# Patient Record
Sex: MALE | Race: BLACK OR AFRICAN AMERICAN | NOT HISPANIC OR LATINO | URBAN - METROPOLITAN AREA
[De-identification: names, ages, dates, MRNs, and addresses within clinical notes are randomized per-mention and may not be internally consistent; named-entity substitution may affect disease eponyms.]

---

## 2017-08-17 ENCOUNTER — EMERGENCY (EMERGENCY)
Facility: HOSPITAL | Age: 57
LOS: 1 days | Discharge: HOME | End: 2017-08-17
Attending: EMERGENCY MEDICINE | Admitting: EMERGENCY MEDICINE
Payer: COMMERCIAL

## 2017-08-17 VITALS
SYSTOLIC BLOOD PRESSURE: 160 MMHG | TEMPERATURE: 98 F | DIASTOLIC BLOOD PRESSURE: 90 MMHG | HEART RATE: 85 BPM | RESPIRATION RATE: 18 BRPM | OXYGEN SATURATION: 98 %

## 2017-08-17 VITALS — DIASTOLIC BLOOD PRESSURE: 71 MMHG | OXYGEN SATURATION: 100 % | SYSTOLIC BLOOD PRESSURE: 132 MMHG | HEART RATE: 66 BPM

## 2017-08-17 DIAGNOSIS — Z98.89 OTHER SPECIFIED POSTPROCEDURAL STATES: Chronic | ICD-10-CM

## 2017-08-17 LAB
ALBUMIN SERPL ELPH-MCNC: 4.9 G/DL — SIGNIFICANT CHANGE UP (ref 3.3–5)
ALP SERPL-CCNC: 69 U/L — SIGNIFICANT CHANGE UP (ref 40–120)
ALT FLD-CCNC: 16 U/L — SIGNIFICANT CHANGE UP (ref 4–41)
APPEARANCE UR: CLEAR — SIGNIFICANT CHANGE UP
AST SERPL-CCNC: 21 U/L — SIGNIFICANT CHANGE UP (ref 4–40)
BASE EXCESS BLDV CALC-SCNC: 3.4 MMOL/L — SIGNIFICANT CHANGE UP
BASE EXCESS BLDV CALC-SCNC: 4.6 MMOL/L — SIGNIFICANT CHANGE UP
BASOPHILS # BLD AUTO: 0.02 K/UL — SIGNIFICANT CHANGE UP (ref 0–0.2)
BASOPHILS NFR BLD AUTO: 0.2 % — SIGNIFICANT CHANGE UP (ref 0–2)
BILIRUB SERPL-MCNC: 0.4 MG/DL — SIGNIFICANT CHANGE UP (ref 0.2–1.2)
BILIRUB UR-MCNC: NEGATIVE — SIGNIFICANT CHANGE UP
BLOOD GAS VENOUS - CREATININE: 0.85 MG/DL — SIGNIFICANT CHANGE UP (ref 0.5–1.3)
BLOOD GAS VENOUS - CREATININE: SIGNIFICANT CHANGE UP MG/DL (ref 0.5–1.3)
BLOOD UR QL VISUAL: HIGH
BUN SERPL-MCNC: 9 MG/DL — SIGNIFICANT CHANGE UP (ref 7–23)
BUN SERPL-MCNC: 9 MG/DL — SIGNIFICANT CHANGE UP (ref 7–23)
CALCIUM SERPL-MCNC: 9.3 MG/DL — SIGNIFICANT CHANGE UP (ref 8.4–10.5)
CALCIUM SERPL-MCNC: 9.4 MG/DL — SIGNIFICANT CHANGE UP (ref 8.4–10.5)
CHLORIDE BLDV-SCNC: 103 MMOL/L — SIGNIFICANT CHANGE UP (ref 96–108)
CHLORIDE BLDV-SCNC: 106 MMOL/L — SIGNIFICANT CHANGE UP (ref 96–108)
CHLORIDE SERPL-SCNC: 101 MMOL/L — SIGNIFICANT CHANGE UP (ref 98–107)
CHLORIDE SERPL-SCNC: 106 MMOL/L — SIGNIFICANT CHANGE UP (ref 98–107)
CO2 SERPL-SCNC: 25 MMOL/L — SIGNIFICANT CHANGE UP (ref 22–31)
CO2 SERPL-SCNC: 26 MMOL/L — SIGNIFICANT CHANGE UP (ref 22–31)
COLOR SPEC: SIGNIFICANT CHANGE UP
CREAT SERPL-MCNC: 0.92 MG/DL — SIGNIFICANT CHANGE UP (ref 0.5–1.3)
CREAT SERPL-MCNC: 1.02 MG/DL — SIGNIFICANT CHANGE UP (ref 0.5–1.3)
EOSINOPHIL # BLD AUTO: 0.01 K/UL — SIGNIFICANT CHANGE UP (ref 0–0.5)
EOSINOPHIL NFR BLD AUTO: 0.1 % — SIGNIFICANT CHANGE UP (ref 0–6)
GAS PNL BLDV: 141 MMOL/L — SIGNIFICANT CHANGE UP (ref 136–146)
GAS PNL BLDV: 142 MMOL/L — SIGNIFICANT CHANGE UP (ref 136–146)
GLUCOSE BLDV-MCNC: 103 — HIGH (ref 70–99)
GLUCOSE BLDV-MCNC: 136 — HIGH (ref 70–99)
GLUCOSE SERPL-MCNC: 131 MG/DL — HIGH (ref 70–99)
GLUCOSE SERPL-MCNC: 96 MG/DL — SIGNIFICANT CHANGE UP (ref 70–99)
GLUCOSE UR-MCNC: NEGATIVE — SIGNIFICANT CHANGE UP
HCO3 BLDV-SCNC: 25 MMOL/L — SIGNIFICANT CHANGE UP (ref 20–27)
HCO3 BLDV-SCNC: 25 MMOL/L — SIGNIFICANT CHANGE UP (ref 20–27)
HCT VFR BLD CALC: 46.3 % — SIGNIFICANT CHANGE UP (ref 39–50)
HCT VFR BLDV CALC: 46.9 % — SIGNIFICANT CHANGE UP (ref 39–51)
HCT VFR BLDV CALC: 49.7 % — SIGNIFICANT CHANGE UP (ref 39–51)
HGB BLD-MCNC: 15.8 G/DL — SIGNIFICANT CHANGE UP (ref 13–17)
HGB BLDV-MCNC: 15.3 G/DL — SIGNIFICANT CHANGE UP (ref 13–17)
HGB BLDV-MCNC: 16.2 G/DL — SIGNIFICANT CHANGE UP (ref 13–17)
IMM GRANULOCYTES # BLD AUTO: 0.07 # — SIGNIFICANT CHANGE UP
IMM GRANULOCYTES NFR BLD AUTO: 0.7 % — SIGNIFICANT CHANGE UP (ref 0–1.5)
KETONES UR-MCNC: NEGATIVE — SIGNIFICANT CHANGE UP
LACTATE BLDV-MCNC: 2.2 MMOL/L — HIGH (ref 0.5–2)
LACTATE BLDV-MCNC: 2.5 MMOL/L — HIGH (ref 0.5–2)
LEUKOCYTE ESTERASE UR-ACNC: NEGATIVE — SIGNIFICANT CHANGE UP
LYMPHOCYTES # BLD AUTO: 1.29 K/UL — SIGNIFICANT CHANGE UP (ref 1–3.3)
LYMPHOCYTES # BLD AUTO: 12.1 % — LOW (ref 13–44)
MCHC RBC-ENTMCNC: 30 PG — SIGNIFICANT CHANGE UP (ref 27–34)
MCHC RBC-ENTMCNC: 34.1 % — SIGNIFICANT CHANGE UP (ref 32–36)
MCV RBC AUTO: 88 FL — SIGNIFICANT CHANGE UP (ref 80–100)
MONOCYTES # BLD AUTO: 0.52 K/UL — SIGNIFICANT CHANGE UP (ref 0–0.9)
MONOCYTES NFR BLD AUTO: 4.9 % — SIGNIFICANT CHANGE UP (ref 2–14)
MUCOUS THREADS # UR AUTO: SIGNIFICANT CHANGE UP
NEUTROPHILS # BLD AUTO: 8.79 K/UL — HIGH (ref 1.8–7.4)
NEUTROPHILS NFR BLD AUTO: 82 % — HIGH (ref 43–77)
NITRITE UR-MCNC: NEGATIVE — SIGNIFICANT CHANGE UP
NRBC # FLD: 0 — SIGNIFICANT CHANGE UP
PCO2 BLDV: 58 MMHG — HIGH (ref 41–51)
PCO2 BLDV: 61 MMHG — HIGH (ref 41–51)
PH BLDV: 7.32 PH — SIGNIFICANT CHANGE UP (ref 7.32–7.43)
PH BLDV: 7.32 PH — SIGNIFICANT CHANGE UP (ref 7.32–7.43)
PH UR: 6 — SIGNIFICANT CHANGE UP (ref 4.6–8)
PLATELET # BLD AUTO: 190 K/UL — SIGNIFICANT CHANGE UP (ref 150–400)
PMV BLD: 9.4 FL — SIGNIFICANT CHANGE UP (ref 7–13)
PO2 BLDV: 29 MMHG — LOW (ref 35–40)
PO2 BLDV: < 24 MMHG — LOW (ref 35–40)
POTASSIUM BLDV-SCNC: 3.3 MMOL/L — LOW (ref 3.4–4.5)
POTASSIUM BLDV-SCNC: 4.3 MMOL/L — SIGNIFICANT CHANGE UP (ref 3.4–4.5)
POTASSIUM SERPL-MCNC: 3.6 MMOL/L — SIGNIFICANT CHANGE UP (ref 3.5–5.3)
POTASSIUM SERPL-MCNC: 4.2 MMOL/L — SIGNIFICANT CHANGE UP (ref 3.5–5.3)
POTASSIUM SERPL-SCNC: 3.6 MMOL/L — SIGNIFICANT CHANGE UP (ref 3.5–5.3)
POTASSIUM SERPL-SCNC: 4.2 MMOL/L — SIGNIFICANT CHANGE UP (ref 3.5–5.3)
PROT SERPL-MCNC: 8.3 G/DL — SIGNIFICANT CHANGE UP (ref 6–8.3)
PROT UR-MCNC: NEGATIVE — SIGNIFICANT CHANGE UP
RBC # BLD: 5.26 M/UL — SIGNIFICANT CHANGE UP (ref 4.2–5.8)
RBC # FLD: 12.4 % — SIGNIFICANT CHANGE UP (ref 10.3–14.5)
RBC CASTS # UR COMP ASSIST: >50 — HIGH (ref 0–?)
SAO2 % BLDV: 24.4 % — LOW (ref 60–85)
SAO2 % BLDV: 45.9 % — LOW (ref 60–85)
SODIUM SERPL-SCNC: 143 MMOL/L — SIGNIFICANT CHANGE UP (ref 135–145)
SODIUM SERPL-SCNC: 145 MMOL/L — SIGNIFICANT CHANGE UP (ref 135–145)
SP GR SPEC: 1.01 — SIGNIFICANT CHANGE UP (ref 1–1.03)
UROBILINOGEN FLD QL: NORMAL E.U. — SIGNIFICANT CHANGE UP (ref 0.1–0.2)
WBC # BLD: 10.7 K/UL — HIGH (ref 3.8–10.5)
WBC # FLD AUTO: 10.7 K/UL — HIGH (ref 3.8–10.5)
WBC UR QL: SIGNIFICANT CHANGE UP (ref 0–?)

## 2017-08-17 PROCEDURE — 74176 CT ABD & PELVIS W/O CONTRAST: CPT | Mod: 26

## 2017-08-17 PROCEDURE — 99284 EMERGENCY DEPT VISIT MOD MDM: CPT

## 2017-08-17 RX ORDER — SODIUM CHLORIDE 9 MG/ML
500 INJECTION INTRAMUSCULAR; INTRAVENOUS; SUBCUTANEOUS ONCE
Qty: 0 | Refills: 0 | Status: COMPLETED | OUTPATIENT
Start: 2017-08-17 | End: 2017-08-17

## 2017-08-17 RX ORDER — MORPHINE SULFATE 50 MG/1
4 CAPSULE, EXTENDED RELEASE ORAL ONCE
Qty: 0 | Refills: 0 | Status: DISCONTINUED | OUTPATIENT
Start: 2017-08-17 | End: 2017-08-17

## 2017-08-17 RX ORDER — SODIUM CHLORIDE 9 MG/ML
1000 INJECTION INTRAMUSCULAR; INTRAVENOUS; SUBCUTANEOUS ONCE
Qty: 0 | Refills: 0 | Status: COMPLETED | OUTPATIENT
Start: 2017-08-17 | End: 2017-08-17

## 2017-08-17 RX ORDER — LIDOCAINE HCL 20 MG/ML
5 VIAL (ML) INJECTION ONCE
Qty: 0 | Refills: 0 | Status: COMPLETED | OUTPATIENT
Start: 2017-08-17 | End: 2017-08-17

## 2017-08-17 RX ADMIN — SODIUM CHLORIDE 1000 MILLILITER(S): 9 INJECTION INTRAMUSCULAR; INTRAVENOUS; SUBCUTANEOUS at 18:01

## 2017-08-17 RX ADMIN — Medication 5 MILLILITER(S): at 15:18

## 2017-08-17 RX ADMIN — SODIUM CHLORIDE 500 MILLILITER(S): 9 INJECTION INTRAMUSCULAR; INTRAVENOUS; SUBCUTANEOUS at 15:41

## 2017-08-17 RX ADMIN — MORPHINE SULFATE 4 MILLIGRAM(S): 50 CAPSULE, EXTENDED RELEASE ORAL at 16:00

## 2017-08-17 RX ADMIN — MORPHINE SULFATE 4 MILLIGRAM(S): 50 CAPSULE, EXTENDED RELEASE ORAL at 15:18

## 2017-08-17 NOTE — ED PROVIDER NOTE - PLAN OF CARE
PLEASE MAKE SURE THAT YOU FOLLOW UP WITH YOUR UROLOGIST WITHIN NEXT 3-4 DAYS, FOR NOW WE NEED TO LEAVE THE CATHETER IN. IF WE FIND ANYTHING ON THE URINE CULTURE WE WILL CALL YOU. RETURN TO ER FOR BLOOD IN URINE, FEVER, ABDOMINAL PAIN, LOWER BACK PAIN OR IF THE CATHETER IS NOT DRAINING.

## 2017-08-17 NOTE — ED PROVIDER NOTE - PROGRESS NOTE DETAILS
ROBLES MERRILL inserted by RN with about 800cc of cheryl urine output. ROBLES Flannery - pt is still pain free; FC drained about 1200cc of urie, ct with evidence of recently passed stone - will dc with leg bag, close gu f/u.

## 2017-08-17 NOTE — ED PROVIDER NOTE - OBJECTIVE STATEMENT
Pt is 58 y/o male with PMhx of kidney stone here for eval of suprapubic pain and inability to urinate since 5am today. Pt states that he initially noted vague lower back pain (Lt>Rt) with radiation to Rt flank, got concerned as he has been having urinary urgency but not able to urinate, (-) fever, chills, denies dysuria or hematuria prior, admits that pain feels like kidney stone.   - Dr nguyen, non-[facility]. (-) testicular pain, penile discharge, denies hx f STds.

## 2017-08-17 NOTE — ED ADULT NURSE NOTE - OBJECTIVE STATEMENT
pt received a&ox3, c/o inability to urinate since 5/6 this morning, pt c/o left lower quad and back pain, pt appears extremely uncomfortable, md notified immediately, 16 Vietnamese maria placed , draining initially blood tinged urine most likely from trauma of entry, clear/yellow urine draining at bedside, 20 gauge IV placed in left ac, labs drawn and sent, will continue to monitor .

## 2017-08-17 NOTE — ED PROVIDER NOTE - ATTENDING CONTRIBUTION TO CARE
57 year old M w hx of kidney stone presents w abdominal pain and distention for past 24 hours.  US shows urinary retention.  PE + abdominal distention + diffuse tenderness  lungs cta b/l neuro nonfocal exam Impression:  Urinary Retention w possible stone. ct pending.

## 2017-08-17 NOTE — ED PROVIDER NOTE - CARE PLAN
Instructions for follow-up, activity and diet:	PLEASE MAKE SURE THAT YOU FOLLOW UP WITH YOUR UROLOGIST WITHIN NEXT 3-4 DAYS, FOR NOW WE NEED TO LEAVE THE CATHETER IN. IF WE FIND ANYTHING ON THE URINE CULTURE WE WILL CALL YOU. RETURN TO ER FOR BLOOD IN URINE, FEVER, ABDOMINAL PAIN, LOWER BACK PAIN OR IF THE CATHETER IS NOT DRAINING. Principal Discharge DX:	Urinary retention  Instructions for follow-up, activity and diet:	PLEASE MAKE SURE THAT YOU FOLLOW UP WITH YOUR UROLOGIST WITHIN NEXT 3-4 DAYS, FOR NOW WE NEED TO LEAVE THE CATHETER IN. IF WE FIND ANYTHING ON THE URINE CULTURE WE WILL CALL YOU. RETURN TO ER FOR BLOOD IN URINE, FEVER, ABDOMINAL PAIN, LOWER BACK PAIN OR IF THE CATHETER IS NOT DRAINING.

## 2017-08-17 NOTE — ED ADULT TRIAGE NOTE - CHIEF COMPLAINT QUOTE
p/t c/o of lower back pain radiating to groin area since this am p/t appears uncomfortable hx kidney stones

## 2017-08-18 LAB
BACTERIA UR CULT: SIGNIFICANT CHANGE UP
SPECIMEN SOURCE: SIGNIFICANT CHANGE UP

## 2017-10-13 ENCOUNTER — OUTPATIENT (OUTPATIENT)
Dept: OUTPATIENT SERVICES | Facility: HOSPITAL | Age: 57
LOS: 1 days | End: 2017-10-13
Payer: COMMERCIAL

## 2017-10-13 DIAGNOSIS — Z98.89 OTHER SPECIFIED POSTPROCEDURAL STATES: Chronic | ICD-10-CM

## 2017-10-13 DIAGNOSIS — R05 COUGH: ICD-10-CM

## 2017-10-13 PROCEDURE — 71020: CPT | Mod: 26

## 2017-10-13 PROCEDURE — 93005 ELECTROCARDIOGRAM TRACING: CPT

## 2017-10-13 PROCEDURE — 71046 X-RAY EXAM CHEST 2 VIEWS: CPT

## 2017-10-13 PROCEDURE — 93010 ELECTROCARDIOGRAM REPORT: CPT

## 2017-10-22 ENCOUNTER — INPATIENT (INPATIENT)
Facility: HOSPITAL | Age: 57
LOS: 2 days | Discharge: HOME | End: 2017-10-25
Attending: HOSPITALIST | Admitting: HOSPITALIST
Payer: COMMERCIAL

## 2017-10-22 VITALS
TEMPERATURE: 98 F | OXYGEN SATURATION: 99 % | RESPIRATION RATE: 18 BRPM | SYSTOLIC BLOOD PRESSURE: 146 MMHG | HEART RATE: 86 BPM | DIASTOLIC BLOOD PRESSURE: 88 MMHG

## 2017-10-22 DIAGNOSIS — Z98.89 OTHER SPECIFIED POSTPROCEDURAL STATES: Chronic | ICD-10-CM

## 2017-10-22 DIAGNOSIS — R05 COUGH: ICD-10-CM

## 2017-10-22 DIAGNOSIS — K21.9 GASTRO-ESOPHAGEAL REFLUX DISEASE WITHOUT ESOPHAGITIS: ICD-10-CM

## 2017-10-22 DIAGNOSIS — N17.9 ACUTE KIDNEY FAILURE, UNSPECIFIED: ICD-10-CM

## 2017-10-22 DIAGNOSIS — R33.9 RETENTION OF URINE, UNSPECIFIED: ICD-10-CM

## 2017-10-22 DIAGNOSIS — R10.9 UNSPECIFIED ABDOMINAL PAIN: ICD-10-CM

## 2017-10-22 DIAGNOSIS — Z98.890 OTHER SPECIFIED POSTPROCEDURAL STATES: Chronic | ICD-10-CM

## 2017-10-22 DIAGNOSIS — N12 TUBULO-INTERSTITIAL NEPHRITIS, NOT SPECIFIED AS ACUTE OR CHRONIC: ICD-10-CM

## 2017-10-22 DIAGNOSIS — N13.8 OTHER OBSTRUCTIVE AND REFLUX UROPATHY: ICD-10-CM

## 2017-10-22 DIAGNOSIS — I10 ESSENTIAL (PRIMARY) HYPERTENSION: ICD-10-CM

## 2017-10-22 DIAGNOSIS — Z29.9 ENCOUNTER FOR PROPHYLACTIC MEASURES, UNSPECIFIED: ICD-10-CM

## 2017-10-22 LAB
ALBUMIN SERPL ELPH-MCNC: 4.4 G/DL — SIGNIFICANT CHANGE UP (ref 3.3–5)
ALP SERPL-CCNC: 69 U/L — SIGNIFICANT CHANGE UP (ref 40–120)
ALT FLD-CCNC: 33 U/L — SIGNIFICANT CHANGE UP (ref 4–41)
APPEARANCE UR: SIGNIFICANT CHANGE UP
APTT BLD: 27.9 SEC — SIGNIFICANT CHANGE UP (ref 27.5–37.4)
AST SERPL-CCNC: 24 U/L — SIGNIFICANT CHANGE UP (ref 4–40)
BACTERIA # UR AUTO: HIGH
BASE EXCESS BLDV CALC-SCNC: 6.6 MMOL/L — SIGNIFICANT CHANGE UP
BASOPHILS # BLD AUTO: 0.03 K/UL — SIGNIFICANT CHANGE UP (ref 0–0.2)
BASOPHILS NFR BLD AUTO: 0.3 % — SIGNIFICANT CHANGE UP (ref 0–2)
BILIRUB SERPL-MCNC: 0.7 MG/DL — SIGNIFICANT CHANGE UP (ref 0.2–1.2)
BILIRUB UR-MCNC: NEGATIVE — SIGNIFICANT CHANGE UP
BLD GP AB SCN SERPL QL: NEGATIVE — SIGNIFICANT CHANGE UP
BLOOD GAS VENOUS - CREATININE: 2.72 MG/DL — HIGH (ref 0.5–1.3)
BLOOD UR QL VISUAL: HIGH
BUN SERPL-MCNC: 24 MG/DL — HIGH (ref 7–23)
CALCIUM SERPL-MCNC: 9.5 MG/DL — SIGNIFICANT CHANGE UP (ref 8.4–10.5)
CHLORIDE BLDV-SCNC: 101 MMOL/L — SIGNIFICANT CHANGE UP (ref 96–108)
CHLORIDE SERPL-SCNC: 98 MMOL/L — SIGNIFICANT CHANGE UP (ref 98–107)
CO2 SERPL-SCNC: 28 MMOL/L — SIGNIFICANT CHANGE UP (ref 22–31)
COLOR SPEC: YELLOW — SIGNIFICANT CHANGE UP
CREAT SERPL-MCNC: 2.7 MG/DL — HIGH (ref 0.5–1.3)
EOSINOPHIL # BLD AUTO: 0.02 K/UL — SIGNIFICANT CHANGE UP (ref 0–0.5)
EOSINOPHIL NFR BLD AUTO: 0.2 % — SIGNIFICANT CHANGE UP (ref 0–6)
GAS PNL BLDV: 138 MMOL/L — SIGNIFICANT CHANGE UP (ref 136–146)
GLUCOSE BLDV-MCNC: 121 — HIGH (ref 70–99)
GLUCOSE SERPL-MCNC: 117 MG/DL — HIGH (ref 70–99)
GLUCOSE UR-MCNC: NEGATIVE — SIGNIFICANT CHANGE UP
HCO3 BLDV-SCNC: 27 MMOL/L — SIGNIFICANT CHANGE UP (ref 20–27)
HCT VFR BLD CALC: 42.9 % — SIGNIFICANT CHANGE UP (ref 39–50)
HCT VFR BLDV CALC: 46.9 % — SIGNIFICANT CHANGE UP (ref 39–51)
HGB BLD-MCNC: 14.5 G/DL — SIGNIFICANT CHANGE UP (ref 13–17)
HGB BLDV-MCNC: 15.3 G/DL — SIGNIFICANT CHANGE UP (ref 13–17)
IMM GRANULOCYTES # BLD AUTO: 0.05 # — SIGNIFICANT CHANGE UP
IMM GRANULOCYTES NFR BLD AUTO: 0.4 % — SIGNIFICANT CHANGE UP (ref 0–1.5)
INR BLD: 1.3 — HIGH (ref 0.88–1.17)
KETONES UR-MCNC: NEGATIVE — SIGNIFICANT CHANGE UP
LACTATE BLDV-MCNC: 1.2 MMOL/L — SIGNIFICANT CHANGE UP (ref 0.5–2)
LEUKOCYTE ESTERASE UR-ACNC: HIGH
LIDOCAIN IGE QN: 12.4 U/L — SIGNIFICANT CHANGE UP (ref 7–60)
LYMPHOCYTES # BLD AUTO: 0.97 K/UL — LOW (ref 1–3.3)
LYMPHOCYTES # BLD AUTO: 8.2 % — LOW (ref 13–44)
MCHC RBC-ENTMCNC: 29.7 PG — SIGNIFICANT CHANGE UP (ref 27–34)
MCHC RBC-ENTMCNC: 33.8 % — SIGNIFICANT CHANGE UP (ref 32–36)
MCV RBC AUTO: 87.7 FL — SIGNIFICANT CHANGE UP (ref 80–100)
MONOCYTES # BLD AUTO: 0.94 K/UL — HIGH (ref 0–0.9)
MONOCYTES NFR BLD AUTO: 7.9 % — SIGNIFICANT CHANGE UP (ref 2–14)
NEUTROPHILS # BLD AUTO: 9.82 K/UL — HIGH (ref 1.8–7.4)
NEUTROPHILS NFR BLD AUTO: 83 % — HIGH (ref 43–77)
NITRITE UR-MCNC: NEGATIVE — SIGNIFICANT CHANGE UP
NRBC # FLD: 0 — SIGNIFICANT CHANGE UP
PCO2 BLDV: 57 MMHG — HIGH (ref 41–51)
PH BLDV: 7.37 PH — SIGNIFICANT CHANGE UP (ref 7.32–7.43)
PH UR: 6 — SIGNIFICANT CHANGE UP (ref 4.6–8)
PLATELET # BLD AUTO: 234 K/UL — SIGNIFICANT CHANGE UP (ref 150–400)
PMV BLD: 8.7 FL — SIGNIFICANT CHANGE UP (ref 7–13)
PO2 BLDV: < 24 MMHG — LOW (ref 35–40)
POTASSIUM BLDV-SCNC: 3.6 MMOL/L — SIGNIFICANT CHANGE UP (ref 3.4–4.5)
POTASSIUM SERPL-MCNC: 3.7 MMOL/L — SIGNIFICANT CHANGE UP (ref 3.5–5.3)
POTASSIUM SERPL-SCNC: 3.7 MMOL/L — SIGNIFICANT CHANGE UP (ref 3.5–5.3)
PROT SERPL-MCNC: 8.3 G/DL — SIGNIFICANT CHANGE UP (ref 6–8.3)
PROT UR-MCNC: 100 — SIGNIFICANT CHANGE UP
PROTHROM AB SERPL-ACNC: 14.6 SEC — HIGH (ref 9.8–13.1)
RBC # BLD: 4.89 M/UL — SIGNIFICANT CHANGE UP (ref 4.2–5.8)
RBC # FLD: 13.1 % — SIGNIFICANT CHANGE UP (ref 10.3–14.5)
RBC CASTS # UR COMP ASSIST: SIGNIFICANT CHANGE UP (ref 0–?)
RH IG SCN BLD-IMP: POSITIVE — SIGNIFICANT CHANGE UP
SAO2 % BLDV: 29.8 % — LOW (ref 60–85)
SODIUM SERPL-SCNC: 141 MMOL/L — SIGNIFICANT CHANGE UP (ref 135–145)
SP GR SPEC: 1.02 — SIGNIFICANT CHANGE UP (ref 1–1.03)
UROBILINOGEN FLD QL: NORMAL E.U. — SIGNIFICANT CHANGE UP (ref 0.1–0.2)
WBC # BLD: 11.83 K/UL — HIGH (ref 3.8–10.5)
WBC # FLD AUTO: 11.83 K/UL — HIGH (ref 3.8–10.5)
WBC UR QL: >50 — HIGH (ref 0–?)

## 2017-10-22 PROCEDURE — 99223 1ST HOSP IP/OBS HIGH 75: CPT | Mod: GC

## 2017-10-22 RX ORDER — ACETAMINOPHEN 500 MG
650 TABLET ORAL EVERY 6 HOURS
Qty: 0 | Refills: 0 | Status: DISCONTINUED | OUTPATIENT
Start: 2017-10-22 | End: 2017-10-25

## 2017-10-22 RX ORDER — TAMSULOSIN HYDROCHLORIDE 0.4 MG/1
0.4 CAPSULE ORAL AT BEDTIME
Qty: 0 | Refills: 0 | Status: DISCONTINUED | OUTPATIENT
Start: 2017-10-22 | End: 2017-10-24

## 2017-10-22 RX ORDER — MOMETASONE FUROATE 50 UG/1
2 SPRAY NASAL
Qty: 0 | Refills: 0 | COMMUNITY

## 2017-10-22 RX ORDER — ESOMEPRAZOLE MAGNESIUM 40 MG/1
0 CAPSULE, DELAYED RELEASE ORAL
Qty: 0 | Refills: 0 | COMMUNITY

## 2017-10-22 RX ORDER — MORPHINE SULFATE 50 MG/1
4 CAPSULE, EXTENDED RELEASE ORAL ONCE
Qty: 0 | Refills: 0 | Status: DISCONTINUED | OUTPATIENT
Start: 2017-10-22 | End: 2017-10-22

## 2017-10-22 RX ORDER — LORATADINE 10 MG/1
1 TABLET ORAL
Qty: 0 | Refills: 0 | COMMUNITY

## 2017-10-22 RX ORDER — CEFTRIAXONE 500 MG/1
1 INJECTION, POWDER, FOR SOLUTION INTRAMUSCULAR; INTRAVENOUS ONCE
Qty: 0 | Refills: 0 | Status: COMPLETED | OUTPATIENT
Start: 2017-10-22 | End: 2017-10-22

## 2017-10-22 RX ORDER — CEFTRIAXONE 500 MG/1
1 INJECTION, POWDER, FOR SOLUTION INTRAMUSCULAR; INTRAVENOUS EVERY 24 HOURS
Qty: 0 | Refills: 0 | Status: DISCONTINUED | OUTPATIENT
Start: 2017-10-23 | End: 2017-10-25

## 2017-10-22 RX ORDER — AMLODIPINE BESYLATE 2.5 MG/1
10 TABLET ORAL DAILY
Qty: 0 | Refills: 0 | Status: DISCONTINUED | OUTPATIENT
Start: 2017-10-22 | End: 2017-10-25

## 2017-10-22 RX ORDER — DOCUSATE SODIUM 100 MG
100 CAPSULE ORAL
Qty: 0 | Refills: 0 | Status: DISCONTINUED | OUTPATIENT
Start: 2017-10-22 | End: 2017-10-25

## 2017-10-22 RX ORDER — FLUTICASONE PROPIONATE 50 MCG
1 SPRAY, SUSPENSION NASAL DAILY
Qty: 0 | Refills: 0 | Status: DISCONTINUED | OUTPATIENT
Start: 2017-10-22 | End: 2017-10-25

## 2017-10-22 RX ORDER — SODIUM CHLORIDE 9 MG/ML
1000 INJECTION INTRAMUSCULAR; INTRAVENOUS; SUBCUTANEOUS ONCE
Qty: 0 | Refills: 0 | Status: COMPLETED | OUTPATIENT
Start: 2017-10-22 | End: 2017-10-22

## 2017-10-22 RX ORDER — INFLUENZA VIRUS VACCINE 15; 15; 15; 15 UG/.5ML; UG/.5ML; UG/.5ML; UG/.5ML
0.5 SUSPENSION INTRAMUSCULAR ONCE
Qty: 0 | Refills: 0 | Status: COMPLETED | OUTPATIENT
Start: 2017-10-22 | End: 2017-10-22

## 2017-10-22 RX ADMIN — CEFTRIAXONE 100 GRAM(S): 500 INJECTION, POWDER, FOR SOLUTION INTRAMUSCULAR; INTRAVENOUS at 13:24

## 2017-10-22 RX ADMIN — SODIUM CHLORIDE 1000 MILLILITER(S): 9 INJECTION INTRAMUSCULAR; INTRAVENOUS; SUBCUTANEOUS at 12:17

## 2017-10-22 RX ADMIN — Medication 100 MILLIGRAM(S): at 18:36

## 2017-10-22 RX ADMIN — TAMSULOSIN HYDROCHLORIDE 0.4 MILLIGRAM(S): 0.4 CAPSULE ORAL at 22:20

## 2017-10-22 RX ADMIN — MORPHINE SULFATE 4 MILLIGRAM(S): 50 CAPSULE, EXTENDED RELEASE ORAL at 12:32

## 2017-10-22 RX ADMIN — MORPHINE SULFATE 4 MILLIGRAM(S): 50 CAPSULE, EXTENDED RELEASE ORAL at 12:17

## 2017-10-22 NOTE — H&P ADULT - NSHPPHYSICALEXAM_GEN_ALL_CORE
Vital Signs Last 24 Hrs  T(C): 36.8 (22 Oct 2017 15:04), Max: 37.1 (22 Oct 2017 11:53)  T(F): 98.3 (22 Oct 2017 15:04), Max: 98.7 (22 Oct 2017 11:53)  HR: 68 (22 Oct 2017 15:04) (68 - 86)  BP: 119/70 (22 Oct 2017 15:04) (119/70 - 164/86)  BP(mean): --  RR: 18 (22 Oct 2017 15:04) (16 - 18)  SpO2: 99% (22 Oct 2017 15:04) (99% - 100%)    PHYSICAL EXAM:  GENERAL: NAD, well-groomed, well-developed, sitting up in bed  HEAD:  Atraumatic, Normocephalic  EYES: EOMI, PERRLA, conjunctiva, muddy sclera  ENMT: no palatal erythema, no thrust; MMM, good dentition  NECK: Supple, No JVD  NERVOUS SYSTEM: AOX3, motor and sensation grossly intact in b/l UE and b/l LE  PSYCHIATRIC: Appropriate affect and mood  CHEST/LUNG: CTAB, no rales, rhonchi, wheezing, or rubs  HEART: RRR no MRG; no LE edema  ABDOMEN: mildly distended, non-tender to deep palpation except residual pain suprapubic area, NBS, no rigidity or fluid wave  EXTREMITIES:  2+ Peripheral Pulses, No clubbing, cyanosis  SKIN: No rashes or lesions

## 2017-10-22 NOTE — H&P ADULT - PROBLEM SELECTOR PLAN 1
Leukocytosis with neutrophilic predominance, R flank pain concerning for pyelonephritis likely in the setting of post-procedure obstructive nephropathy. Pt recently had laser lithotripsy on 10/20 with progressive suprapubic pain, R flank pain and urinary dysuria/hesitancy since procedure.   -c/w ceftriaxone 1g q24h  -Trend CBC's  -U/A concerning for infection  -Renal and bladder US to r/o hydroureteronephrosis  -Urology consult. Urologist is Dr. Kevin Duarte who could not be reached in ED. Leukocytosis with neutrophilic predominance, R flank pain concerning for pyelonephritis likely in the setting of post-procedure obstructive nephropathy. Pt recently had laser lithotripsy on 10/20 with progressive suprapubic pain, R flank pain and dysuria/ urinary hesitancy since procedure.   -c/w ceftriaxone 1g q24h  -Trend CBC's  -U/A concerning for infection  -Renal and bladder US to r/o hydroureteronephrosis  -Urology consult. Urologist is Dr. Kevin Duarte who could not be reached in ED.

## 2017-10-22 NOTE — ED PROVIDER NOTE - MEDICAL DECISION MAKING DETAILS
lower abdominal pain  - Labs, CT, pain meds lower abdominal pain. Urinary retention vs Pyelonephritis   - Labs, CT, pain meds

## 2017-10-22 NOTE — H&P ADULT - NSHPREVIEWOFSYSTEMS_GEN_ALL_CORE
CONSTITUTIONAL: No fever, weight loss, or fatigue  EYES: No eye pain, visual disturbances, or discharge  ENMT:  No difficulty hearing, tinnitus, vertigo; No sinus or throat pain  RESPIRATORY: +cough worse at night productive white sputum, o/w no wheezing, chills or hemoptysis; No shortness of breath  CARDIOVASCULAR: No chest pain, palpitations, dizziness, or leg swelling  GASTROINTESTINAL: +lower>upper abdominal pain now mostly resolved; No nausea, vomiting, or hematemesis; No diarrhea or constipation. No melena or hematochezia.  GENITOURINARY: +dysuria, frequency, hematuria, hesitancy; no incontinence  NEUROLOGICAL: No headaches, loss of strength, numbness, or tremors  SKIN: No itching, burning, rashes, or lesions   LYMPH NODES: No enlarged glands  ENDOCRINE: No heat or cold intolerance; No polydipsia or polyuria  MUSCULOSKELETAL: No joint pain or swelling;   PSYCHIATRIC: Denies depression, anxiety  HEME/LYMPH: No easy bruising, or bleeding gums  ALLERGY AND IMMUNOLOGIC: No hives or eczema

## 2017-10-22 NOTE — H&P ADULT - PROBLEM SELECTOR PLAN 4
Much improved lower>upper abdominal pain since Hermosillo, though pt also endorses only 1 BM (loose) since procedure in the setting of poor po intake and opiate use. Will start Colace given recent opiate use. No indication for XR abdomen at this time given mostly resolved abdominal pain with Hermosillo.  -Start Colace.  -c/w Hermosillo

## 2017-10-22 NOTE — ED PROVIDER NOTE - OBJECTIVE STATEMENT
56yo M w/ pmhx of HTN, kidney stones s/p lithotripsy on 10/20/17 c/o worsening lower abdominal pain since the procedure. Pt has only one bowel since the procedure, has not been able to pass gas, difficulty defecating. Pt also reports dysuria and hematuria. He denies fever, nausea, vomiting, or any abdominal surgery. 56yo M w/ pmhx of HTN, kidney stones s/p lithotripsy on 10/20/17 c/o worsening lower abdominal pain since the procedure. Pt has only one bowel since the procedure, has not been able to pass gas, reports difficulty urinating, dysuria and hematuria. He denies fever, nausea, vomiting, or any abdominal surgery.

## 2017-10-22 NOTE — H&P ADULT - HISTORY OF PRESENT ILLNESS
Pt is a 56yo M with PMH HTN, GERD, recurrent nephrolithiasis s/p laser lithotripsy lithotripsy on 10/20 (has had 4 lithotripsies in total though this is first laser, the first 13 years ago) presenting with worsening R flank and lower abdominal pain over three days since the procedure. Pain radiated from R flank to anterior low abdomen per patient and is now much improved. He no longer has R flank pain. He is still making urine but endorses dysuria, hesitancy, sensation of incomplete voiding, and hematuria since the procedure with foreign body sensation (like "sand") when he urinates. A Hermosillo was placed in the ED with output of 1.1 L. The patient reports that his lower abdominal pain is markedly improved since Hermosillo placement. He has also had only 1 bowel movement since the procedure; it was loose. He also hasn't been eating well and took 3 Percocet the day of procedure but hasn't required more. Denies fevers, chills, night sweats, head aches, shortness of breath, chest pain, BRBPR, melena, testicular pain or swelling, new numbness or weakness, LOC, joint pain, new rashes or skin lesions. He endorses an occasional cough productive of white sputum beginning one week ago with interval improvement then recurrence one day ago; with increasing abdominal pain he has had more heartburn, occasionally tasting acid in his mouth the past two days. His cough is worse at night, currently improved. For his cough the pt was recently prescribed a 3-day course of azithromycin 500mg and completed the course this past week.     ED course:   Vitals: Tmax 98.7degF, HR 70's-80's, 's/70's, RR 16, SpO2 100% RA.  Labs: U/A large blood, turbid, large LE, neg nitrite, >50 WBC's, many bacteria. VBG pH 7.37 with lactate 1.2 and pCO2 57. SCr 2.70 from baseline 1.02 in 8/2017. WBC 11.83 with ANC 9.82% no bands.   Imaging: no new. Old 8/17/17 CT abd/pelvis: multiple nonobstructing R renal calculi, mild right hydroureteronephrosis which may be due to recently passed calculus.   Interventions: Hermosillo catheter (drained 1150 cc's brown cloudy urine).  Medications: ceftriaxone 1g, morphine 4mg IVP x 1, NS 1 L bolus.

## 2017-10-22 NOTE — ED ADULT NURSE NOTE - OBJECTIVE STATEMENT
pt received a&ox3, multiple c/o, pt s/p lithotripsy x 2 days for kidney stones in right kidney, pt states last bm was right after procedure, c/o abd distention,"fullness", inmabliity to pass gas, lower abd tender to touch, pt also c/o right and left flank pain since procedure, left and right flank tender to touch, pt states he still has hematuria since procedure which he was told was a normal finding, pt c/o burning on urination as well, pt denies NV, vss as reported, pt appears to be in NAD, 20 gauge IV placed in right ac, labs drawn and sent, will continue to monitor.

## 2017-10-22 NOTE — H&P ADULT - PROBLEM SELECTOR PLAN 6
Pt was recently prescribed short course of abx for cough with minimal improvement. Cough is occasionally productive of white sputum. No other URI symptoms, fevers, chills. Suspect 2/2 GERD as pt endorses worsening GERD symptoms since procedure. Will restart PPI when AMARJIT resolves. Lungs CTAB on exam. CXR not indicated.   -Restart PPI when AMARJIT resolves.

## 2017-10-22 NOTE — H&P ADULT - NSHPLABSRESULTS_GEN_ALL_CORE
LABS:                        14.5   11.83 )-----------( 234      ( 22 Oct 2017 11:59 )             42.9     22 Oct 2017 11:59    141    |  98     |  24     ----------------------------<  117    3.7     |  28     |  2.70     Ca    9.5        22 Oct 2017 11:59    TPro  8.3    /  Alb  4.4    /  TBili  0.7    /  DBili  x      /  AST  24     /  ALT  33     /  AlkPhos  69     22 Oct 2017 11:59    PT/INR - ( 22 Oct 2017 12:36 )   PT: 14.6 SEC;   INR: 1.30          PTT - ( 22 Oct 2017 12:36 )  PTT:27.9 SEC    Urinalysis Basic - ( 22 Oct 2017 12:12 )    Color: YELLOW / Appearance: TURBID / S.021 / pH: 6.0  Gluc: NEGATIVE / Ketone: NEGATIVE  / Bili: NEGATIVE / Urobili: NORMAL E.U.   Blood: LARGE / Protein: 100 / Nitrite: NEGATIVE   Leuk Esterase: LARGE / RBC: 25-50 / WBC >50   Sq Epi: x / Non Sq Epi: x / Bacteria: MANY    Blood Gas Venous Comprehensive (10.22.17 @ 11:59)    Blood Gas Venous - Lactate: 1.2: Please note updated reference range. mmol/L    Blood Gas Venous - Chloride: 101 mmol/L    Blood Gas Venous - Creatinine: 2.72: Delta: 0.85 on 17-  Delta: 0.85 on 17- mg/dL    pH, Venous: 7.37 pH    pCO2, Venous: 57 mmHg    pO2, Venous: < 24 mmHg    HCO3, Venous: 27 mmol/L    Base Excess, Venous: 6.6: REFERENCE RANGE = -3 + 2 mmol/L mmol/L    Oxygen Saturation, Venous: 29.8 %    Blood Gas Venous - Sodium: 138 mmol/L    Blood Gas Venous - Potassium: 3.6 mmol/L    Blood Gas Venous - Glucose: 121    Blood Gas Venous - Hemoglobin: 15.3 g/dL    Blood Gas Venous - Hematocrit: 46.9 %    BLOOD CULTURE: none  URINE CULTURE: received    RADIOLOGY & ADDITIONAL TESTS:    Imaging Personally Reviewed:  [ ] YES [x ] N/A    O/w reviewed as per HPI old imaging.

## 2017-10-22 NOTE — ED PROVIDER NOTE - CONSTITUTIONAL, MLM
normal... Well appearing, well nourished, awake, alert, oriented to person, place, time/situation, except for moderate distress.

## 2017-10-22 NOTE — ED PROVIDER NOTE - CARE PLAN
Principal Discharge DX:	Obstructive nephropathy Principal Discharge DX:	Obstructive nephropathy  Secondary Diagnosis:	Pyelonephritis

## 2017-10-22 NOTE — H&P ADULT - ASSESSMENT
58yo M with PMH HTN, GERD, recurrent nephrolithiasis s/p lithotripsy on 10/20 (has had 4 in total, first 13 years ago) presenting with worsening R flank and lower abdominal pain over three days since the procedure, a/w acute complicated UTI, AMARJIT likely 2/2 urinary retention of unclear etiology, and possible pyelonephritis.

## 2017-10-22 NOTE — H&P ADULT - NSHPSOCIALHISTORY_GEN_ALL_CORE
Lives at home with wife and children. No sick contacts at home.   Tobacco: smoked 4-5 cigarettes in his 20's o/w never smoker  EtOH: social, 4-5 beers/month  Recreational drug use: denies

## 2017-10-22 NOTE — H&P ADULT - PROBLEM SELECTOR PLAN 2
Likely 2/2 obstructive nephropathy given hx of recurrent nephrolithiasis, multiple lithotripsies in past and recent 10/20 procedure. Will f/u renal US and urology recommendations and trend BMP's.   -f/u US renal/bladder as above to r/o hydroureteronephrosis

## 2017-10-22 NOTE — H&P ADULT - PROBLEM SELECTOR PLAN 3
As above, suspect obstructive etiology given urinary retention and 1.15 cc removed with Hermosillo insertion in ED.  -c/w Hermosillo  -US bladder/kidney  -Urology consult as above.

## 2017-10-22 NOTE — H&P ADULT - ATTENDING COMMENTS
Agree with Housestaff Note Above, edits made where appropriate, case discussed with housestaff    Patient seen and examined. This is a 57M who is coming for complicated UTI and post-obstructive AMARJIT 2/2 recent urologic procedure (lithotripsy). C/o lower abdominal pain, right flank pain, and urinary frequency/urgency upon admission. Feels better after maria. This has never happened before, procedure occurred on Thursday. ROS otherwise negative (see above)  VSS  Exam as above  Labs and Imaging Reviewed    A/P: 57M here for complicated uti and post obstructive AMARJIT   1. Complicated UTI - male, recent urologic procedure, and flank pain - suspect pyelonephritis. Would c/w Rocephin 1g IV q24h await cultures. Cannot do renal stone protocol given AMARJIT  2. Post-obstructive AMARJIT - c/w maria, trend creatinine  3. Nephrolithiasis - recent lithotripsy, still with sensation of foreign body in urinary stream c.w IVF, appreciate Urology recs  4. Abdominal pain - likely mix of constipation and urinary retention c/w stool softeners  5. Rest of plan as above

## 2017-10-23 LAB
ALBUMIN SERPL ELPH-MCNC: 3.4 G/DL — SIGNIFICANT CHANGE UP (ref 3.3–5)
ALP SERPL-CCNC: 54 U/L — SIGNIFICANT CHANGE UP (ref 40–120)
ALT FLD-CCNC: 26 U/L — SIGNIFICANT CHANGE UP (ref 4–41)
AST SERPL-CCNC: 20 U/L — SIGNIFICANT CHANGE UP (ref 4–40)
BASOPHILS # BLD AUTO: 0.03 K/UL — SIGNIFICANT CHANGE UP (ref 0–0.2)
BASOPHILS NFR BLD AUTO: 0.4 % — SIGNIFICANT CHANGE UP (ref 0–2)
BILIRUB SERPL-MCNC: 0.7 MG/DL — SIGNIFICANT CHANGE UP (ref 0.2–1.2)
BUN SERPL-MCNC: 12 MG/DL — SIGNIFICANT CHANGE UP (ref 7–23)
CALCIUM SERPL-MCNC: 8.9 MG/DL — SIGNIFICANT CHANGE UP (ref 8.4–10.5)
CHLORIDE SERPL-SCNC: 102 MMOL/L — SIGNIFICANT CHANGE UP (ref 98–107)
CO2 SERPL-SCNC: 28 MMOL/L — SIGNIFICANT CHANGE UP (ref 22–31)
CREAT SERPL-MCNC: 1.09 MG/DL — SIGNIFICANT CHANGE UP (ref 0.5–1.3)
EOSINOPHIL # BLD AUTO: 0.28 K/UL — SIGNIFICANT CHANGE UP (ref 0–0.5)
EOSINOPHIL NFR BLD AUTO: 3.8 % — SIGNIFICANT CHANGE UP (ref 0–6)
GLUCOSE SERPL-MCNC: 101 MG/DL — HIGH (ref 70–99)
HBA1C BLD-MCNC: 5.4 % — SIGNIFICANT CHANGE UP (ref 4–5.6)
HCT VFR BLD CALC: 39.3 % — SIGNIFICANT CHANGE UP (ref 39–50)
HGB BLD-MCNC: 13 G/DL — SIGNIFICANT CHANGE UP (ref 13–17)
IMM GRANULOCYTES # BLD AUTO: 0.03 # — SIGNIFICANT CHANGE UP
IMM GRANULOCYTES NFR BLD AUTO: 0.4 % — SIGNIFICANT CHANGE UP (ref 0–1.5)
LYMPHOCYTES # BLD AUTO: 1.39 K/UL — SIGNIFICANT CHANGE UP (ref 1–3.3)
LYMPHOCYTES # BLD AUTO: 18.6 % — SIGNIFICANT CHANGE UP (ref 13–44)
MAGNESIUM SERPL-MCNC: 2 MG/DL — SIGNIFICANT CHANGE UP (ref 1.6–2.6)
MCHC RBC-ENTMCNC: 30 PG — SIGNIFICANT CHANGE UP (ref 27–34)
MCHC RBC-ENTMCNC: 33.1 % — SIGNIFICANT CHANGE UP (ref 32–36)
MCV RBC AUTO: 90.8 FL — SIGNIFICANT CHANGE UP (ref 80–100)
MONOCYTES # BLD AUTO: 0.88 K/UL — SIGNIFICANT CHANGE UP (ref 0–0.9)
MONOCYTES NFR BLD AUTO: 11.8 % — SIGNIFICANT CHANGE UP (ref 2–14)
NEUTROPHILS # BLD AUTO: 4.85 K/UL — SIGNIFICANT CHANGE UP (ref 1.8–7.4)
NEUTROPHILS NFR BLD AUTO: 65 % — SIGNIFICANT CHANGE UP (ref 43–77)
NRBC # FLD: 0 — SIGNIFICANT CHANGE UP
PHOSPHATE SERPL-MCNC: 2.6 MG/DL — SIGNIFICANT CHANGE UP (ref 2.5–4.5)
PLATELET # BLD AUTO: 203 K/UL — SIGNIFICANT CHANGE UP (ref 150–400)
PMV BLD: 9.1 FL — SIGNIFICANT CHANGE UP (ref 7–13)
POTASSIUM SERPL-MCNC: 3.8 MMOL/L — SIGNIFICANT CHANGE UP (ref 3.5–5.3)
POTASSIUM SERPL-SCNC: 3.8 MMOL/L — SIGNIFICANT CHANGE UP (ref 3.5–5.3)
PROT SERPL-MCNC: 6.6 G/DL — SIGNIFICANT CHANGE UP (ref 6–8.3)
RBC # BLD: 4.33 M/UL — SIGNIFICANT CHANGE UP (ref 4.2–5.8)
RBC # FLD: 12.9 % — SIGNIFICANT CHANGE UP (ref 10.3–14.5)
SODIUM SERPL-SCNC: 142 MMOL/L — SIGNIFICANT CHANGE UP (ref 135–145)
SPECIMEN SOURCE: SIGNIFICANT CHANGE UP
WBC # BLD: 7.46 K/UL — SIGNIFICANT CHANGE UP (ref 3.8–10.5)
WBC # FLD AUTO: 7.46 K/UL — SIGNIFICANT CHANGE UP (ref 3.8–10.5)

## 2017-10-23 PROCEDURE — 99233 SBSQ HOSP IP/OBS HIGH 50: CPT

## 2017-10-23 PROCEDURE — 74176 CT ABD & PELVIS W/O CONTRAST: CPT | Mod: 26

## 2017-10-23 RX ORDER — SENNA PLUS 8.6 MG/1
2 TABLET ORAL AT BEDTIME
Qty: 0 | Refills: 0 | Status: DISCONTINUED | OUTPATIENT
Start: 2017-10-23 | End: 2017-10-25

## 2017-10-23 RX ORDER — PANTOPRAZOLE SODIUM 20 MG/1
40 TABLET, DELAYED RELEASE ORAL
Qty: 0 | Refills: 0 | Status: DISCONTINUED | OUTPATIENT
Start: 2017-10-23 | End: 2017-10-25

## 2017-10-23 RX ORDER — POLYETHYLENE GLYCOL 3350 17 G/17G
17 POWDER, FOR SOLUTION ORAL DAILY
Qty: 0 | Refills: 0 | Status: DISCONTINUED | OUTPATIENT
Start: 2017-10-23 | End: 2017-10-25

## 2017-10-23 RX ADMIN — TAMSULOSIN HYDROCHLORIDE 0.4 MILLIGRAM(S): 0.4 CAPSULE ORAL at 21:22

## 2017-10-23 RX ADMIN — Medication 100 MILLIGRAM(S): at 17:00

## 2017-10-23 RX ADMIN — CEFTRIAXONE 100 GRAM(S): 500 INJECTION, POWDER, FOR SOLUTION INTRAMUSCULAR; INTRAVENOUS at 13:47

## 2017-10-23 RX ADMIN — AMLODIPINE BESYLATE 10 MILLIGRAM(S): 2.5 TABLET ORAL at 05:01

## 2017-10-23 RX ADMIN — POLYETHYLENE GLYCOL 3350 17 GRAM(S): 17 POWDER, FOR SOLUTION ORAL at 13:48

## 2017-10-23 RX ADMIN — SENNA PLUS 2 TABLET(S): 8.6 TABLET ORAL at 21:22

## 2017-10-23 RX ADMIN — Medication 100 MILLIGRAM(S): at 05:01

## 2017-10-23 NOTE — PROVIDER CONTACT NOTE (MEDICATION) - ASSESSMENT
+BS X 4 quadrants, passing flatus, hypoactive BS, firm abdomen, good appetite.  Patient on Colace 100mg PO bid.

## 2017-10-23 NOTE — PROVIDER CONTACT NOTE (OTHER) - SITUATION
pt D.T.V by 2200,upon many trial he is unable to void,bladder scanned pt and noted >685cc of urine,and pt bladder is full on palpation,and has slight pain

## 2017-10-23 NOTE — CHART NOTE - NSCHARTNOTEFT_GEN_A_CORE
Had discussion with Dr Ortez pts private Urologist today who recommended to proceed with CT A/P w/o cont to r/o kidney & urethral stones and to cancel Renal / Bladder US. Dr Ortez recommended to d/c Hermosillo and do TOV today. Will continue with Flomax. UA- pos, UCx- GNR TBI on Ceftriaxone. Will continue to monitor pts clinical status.

## 2017-10-24 LAB
-  AMIKACIN: SIGNIFICANT CHANGE UP
-  AMPICILLIN/SULBACTAM: SIGNIFICANT CHANGE UP
-  AMPICILLIN: SIGNIFICANT CHANGE UP
-  AZTREONAM: SIGNIFICANT CHANGE UP
-  CEFAZOLIN: SIGNIFICANT CHANGE UP
-  CEFEPIME: SIGNIFICANT CHANGE UP
-  CEFOXITIN: SIGNIFICANT CHANGE UP
-  CEFTAZIDIME: SIGNIFICANT CHANGE UP
-  CEFTRIAXONE: SIGNIFICANT CHANGE UP
-  CEFUROXIME: SIGNIFICANT CHANGE UP
-  CIPROFLOXACIN: SIGNIFICANT CHANGE UP
-  ERTAPENEM: SIGNIFICANT CHANGE UP
-  GENTAMICIN: SIGNIFICANT CHANGE UP
-  IMIPENEM: SIGNIFICANT CHANGE UP
-  LEVOFLOXACIN: SIGNIFICANT CHANGE UP
-  MEROPENEM: SIGNIFICANT CHANGE UP
-  NITROFURANTOIN: SIGNIFICANT CHANGE UP
-  PIPERACILLIN/TAZOBACTAM: SIGNIFICANT CHANGE UP
-  TIGECYCLINE: SIGNIFICANT CHANGE UP
-  TOBRAMYCIN: SIGNIFICANT CHANGE UP
-  TRIMETHOPRIM/SULFAMETHOXAZOLE: SIGNIFICANT CHANGE UP
BACTERIA UR CULT: SIGNIFICANT CHANGE UP
C DIFF TOX GENS STL QL NAA+PROBE: SIGNIFICANT CHANGE UP
METHOD TYPE: SIGNIFICANT CHANGE UP
ORGANISM # SPEC MICROSCOPIC CNT: SIGNIFICANT CHANGE UP
ORGANISM # SPEC MICROSCOPIC CNT: SIGNIFICANT CHANGE UP

## 2017-10-24 PROCEDURE — 99233 SBSQ HOSP IP/OBS HIGH 50: CPT

## 2017-10-24 RX ORDER — TAMSULOSIN HYDROCHLORIDE 0.4 MG/1
0.4 CAPSULE ORAL
Qty: 0 | Refills: 0 | Status: DISCONTINUED | OUTPATIENT
Start: 2017-10-24 | End: 2017-10-25

## 2017-10-24 RX ORDER — FINASTERIDE 5 MG/1
5 TABLET, FILM COATED ORAL DAILY
Qty: 0 | Refills: 0 | Status: DISCONTINUED | OUTPATIENT
Start: 2017-10-24 | End: 2017-10-25

## 2017-10-24 RX ADMIN — POLYETHYLENE GLYCOL 3350 17 GRAM(S): 17 POWDER, FOR SOLUTION ORAL at 12:44

## 2017-10-24 RX ADMIN — CEFTRIAXONE 100 GRAM(S): 500 INJECTION, POWDER, FOR SOLUTION INTRAMUSCULAR; INTRAVENOUS at 13:36

## 2017-10-24 RX ADMIN — PANTOPRAZOLE SODIUM 40 MILLIGRAM(S): 20 TABLET, DELAYED RELEASE ORAL at 05:35

## 2017-10-24 RX ADMIN — Medication 650 MILLIGRAM(S): at 08:30

## 2017-10-24 RX ADMIN — Medication 100 MILLIGRAM(S): at 17:55

## 2017-10-24 RX ADMIN — TAMSULOSIN HYDROCHLORIDE 0.4 MILLIGRAM(S): 0.4 CAPSULE ORAL at 21:51

## 2017-10-24 RX ADMIN — Medication 650 MILLIGRAM(S): at 20:43

## 2017-10-24 RX ADMIN — Medication 650 MILLIGRAM(S): at 19:58

## 2017-10-24 RX ADMIN — Medication 650 MILLIGRAM(S): at 08:00

## 2017-10-24 RX ADMIN — FINASTERIDE 5 MILLIGRAM(S): 5 TABLET, FILM COATED ORAL at 17:55

## 2017-10-24 RX ADMIN — Medication 100 MILLIGRAM(S): at 05:35

## 2017-10-24 NOTE — CHART NOTE - NSCHARTNOTEFT_GEN_A_CORE
Had discussion with pts private Urologist Dr Ortez. CT A/P results has been reviewed with Dr Ortez. Pt failed TOV on 10/23/17. Per Dr Bridges recs started pt on Proscar, Flomax increased to 2xdaily. Pt to be d/c home with Hermosillo cath and to have TOV done as outpt on Monday with Dr Ortez. Will cont to monitor pts clinical status.

## 2017-10-24 NOTE — PROCEDURE NOTE - PROCEDURE
<<-----Click on this checkbox to enter Procedure Hermosillo catheter to gravity drainage  10/24/2017    Active  JAMALELIN

## 2017-10-24 NOTE — PROCEDURE NOTE - NSURITECHNIQUE_GU_A_CORE
Sterile gloves were worn for the duration of the procedure/Proper hand hygiene was performed/A sterile drape was used to cover all adjacent areas/The site was cleaned with soap/water and sterile solution (betadine)

## 2017-10-25 ENCOUNTER — TRANSCRIPTION ENCOUNTER (OUTPATIENT)
Age: 57
End: 2017-10-25

## 2017-10-25 VITALS
TEMPERATURE: 98 F | RESPIRATION RATE: 17 BRPM | HEART RATE: 77 BPM | DIASTOLIC BLOOD PRESSURE: 74 MMHG | SYSTOLIC BLOOD PRESSURE: 117 MMHG | OXYGEN SATURATION: 100 %

## 2017-10-25 PROCEDURE — 99239 HOSP IP/OBS DSCHRG MGMT >30: CPT

## 2017-10-25 RX ORDER — AZITHROMYCIN 500 MG/1
1 TABLET, FILM COATED ORAL
Qty: 0 | Refills: 0 | COMMUNITY

## 2017-10-25 RX ORDER — ACETAMINOPHEN 500 MG
2 TABLET ORAL
Qty: 0 | Refills: 0 | COMMUNITY
Start: 2017-10-25

## 2017-10-25 RX ORDER — CIPROFLOXACIN LACTATE 400MG/40ML
500 VIAL (ML) INTRAVENOUS EVERY 12 HOURS
Qty: 0 | Refills: 0 | Status: DISCONTINUED | OUTPATIENT
Start: 2017-10-25 | End: 2017-10-25

## 2017-10-25 RX ORDER — CIPROFLOXACIN LACTATE 400MG/40ML
1 VIAL (ML) INTRAVENOUS
Qty: 14 | Refills: 0 | OUTPATIENT
Start: 2017-10-25 | End: 2017-11-01

## 2017-10-25 RX ORDER — OMEPRAZOLE 10 MG/1
1 CAPSULE, DELAYED RELEASE ORAL
Qty: 0 | Refills: 0 | COMMUNITY

## 2017-10-25 RX ORDER — AMLODIPINE BESYLATE 2.5 MG/1
1 TABLET ORAL
Qty: 0 | Refills: 0 | COMMUNITY

## 2017-10-25 RX ORDER — CIPROFLOXACIN LACTATE 400MG/40ML
1 VIAL (ML) INTRAVENOUS
Qty: 8 | Refills: 0 | OUTPATIENT
Start: 2017-10-25 | End: 2017-10-29

## 2017-10-25 RX ORDER — SENNA PLUS 8.6 MG/1
2 TABLET ORAL
Qty: 0 | Refills: 0 | COMMUNITY
Start: 2017-10-25

## 2017-10-25 RX ORDER — OMEPRAZOLE 10 MG/1
1 CAPSULE, DELAYED RELEASE ORAL
Qty: 30 | Refills: 0 | OUTPATIENT
Start: 2017-10-25 | End: 2017-11-24

## 2017-10-25 RX ORDER — POLYETHYLENE GLYCOL 3350 17 G/17G
17 POWDER, FOR SOLUTION ORAL
Qty: 0 | Refills: 0 | COMMUNITY
Start: 2017-10-25

## 2017-10-25 RX ORDER — AMLODIPINE BESYLATE 2.5 MG/1
1 TABLET ORAL
Qty: 30 | Refills: 0 | OUTPATIENT
Start: 2017-10-25 | End: 2017-11-24

## 2017-10-25 RX ORDER — TAMSULOSIN HYDROCHLORIDE 0.4 MG/1
1 CAPSULE ORAL
Qty: 60 | Refills: 0 | OUTPATIENT
Start: 2017-10-25 | End: 2017-11-24

## 2017-10-25 RX ORDER — FINASTERIDE 5 MG/1
1 TABLET, FILM COATED ORAL
Qty: 30 | Refills: 0 | OUTPATIENT
Start: 2017-10-25 | End: 2017-11-24

## 2017-10-25 RX ADMIN — PANTOPRAZOLE SODIUM 40 MILLIGRAM(S): 20 TABLET, DELAYED RELEASE ORAL at 05:56

## 2017-10-25 RX ADMIN — AMLODIPINE BESYLATE 10 MILLIGRAM(S): 2.5 TABLET ORAL at 05:56

## 2017-10-25 RX ADMIN — Medication 100 MILLIGRAM(S): at 05:56

## 2017-10-25 RX ADMIN — TAMSULOSIN HYDROCHLORIDE 0.4 MILLIGRAM(S): 0.4 CAPSULE ORAL at 05:56

## 2017-10-25 NOTE — DISCHARGE NOTE ADULT - PATIENT PORTAL LINK FT
“You can access the [facility] Patient Portal, offered by [facility], by registering with the following website: [email or web address]”

## 2017-10-25 NOTE — DISCHARGE NOTE ADULT - CARE PROVIDER_API CALL
Dr Pérez Quiros Urologist,   Phone: (   )    -  Fax: (   )    -    Koki Rojas), Family Medicine  [address] [place] [address]  Phone: [phone]  Fax: [phone]

## 2017-10-25 NOTE — PROGRESS NOTE ADULT - ATTENDING COMMENTS
D/c home today, discussed with Pt above plan in details. follow up appointment made for Pt with  out patient.

## 2017-10-25 NOTE — PROGRESS NOTE ADULT - SUBJECTIVE AND OBJECTIVE BOX
Patient is a 57y old  Male who presents with a chief complaint of worsening lower abdominal pain x 3 days (22 Oct 2017 15:35)      SUBJECTIVE / OVERNIGHT EVENTS: Pt reported pain resolved. Urine clear in maria. Afebriel, no N/V/D.    MEDICATIONS  (STANDING):  amLODIPine   Tablet 10 milliGRAM(s) Oral daily  cefTRIAXone   IVPB 1 Gram(s) IV Intermittent every 24 hours  docusate sodium 100 milliGRAM(s) Oral two times a day  polyethylene glycol 3350 17 Gram(s) Oral daily  senna 2 Tablet(s) Oral at bedtime  tamsulosin 0.4 milliGRAM(s) Oral at bedtime    MEDICATIONS  (PRN):  acetaminophen   Tablet 650 milliGRAM(s) Oral every 6 hours PRN For Temp greater than 38 C (100.4 F)  acetaminophen   Tablet. 650 milliGRAM(s) Oral every 6 hours PRN mild and moderate pain  fluticasone propionate 50 MICROgram(s)/spray Nasal Spray 1 Spray(s) Both Nostrils daily PRN nasal congestion      Vital Signs Last 24 Hrs  T(C): 36.9 (23 Oct 2017 05:00), Max: 36.9 (23 Oct 2017 05:00)  T(F): 98.5 (23 Oct 2017 05:00), Max: 98.5 (23 Oct 2017 05:00)  HR: 64 (23 Oct 2017 05:00) (64 - 68)  BP: 100/55 (23 Oct 2017 06:35) (100/55 - 127/78)  BP(mean): --  RR: 17 (23 Oct 2017 05:00) (17 - 18)  SpO2: 100% (23 Oct 2017 05:00) (99% - 100%)  CAPILLARY BLOOD GLUCOSE        I&O's Summary    22 Oct 2017 07:  -  23 Oct 2017 07:00  --------------------------------------------------------  IN: 236 mL / OUT: 1525 mL / NET: -1289 mL    23 Oct 2017 07:  -  23 Oct 2017 14:06  --------------------------------------------------------  IN: 410 mL / OUT: 700 mL / NET: -290 mL        PHYSICAL EXAM:  GENERAL: NAD, well-developed  HEAD:  Atraumatic, Normocephalic  EYES: EOMI, PERRLA, conjunctiva and sclera clear  NECK: Supple, No JVD  CHEST/LUNG: Clear to auscultation bilaterally; No wheeze  HEART: Regular rate and rhythm; No murmurs, rubs, or gallops  ABDOMEN: Soft, Nontender, Nondistended; Bowel sounds present, maria in place.   EXTREMITIES:  2+ Peripheral Pulses, No clubbing, cyanosis, or edema  PSYCH: AAOx3  NEUROLOGY: non-focal  SKIN: No rashes or lesions    LABS:                        13.0   7.46  )-----------( 203      ( 23 Oct 2017 06:12 )             39.3     10-    142  |  102  |  12  ----------------------------<  101<H>  3.8   |  28  |  1.09    Ca    8.9      23 Oct 2017 06:13  Phos  2.6     10-  Mg     2.0     10-    TPro  6.6  /  Alb  3.4  /  TBili  0.7  /  DBili  x   /  AST  20  /  ALT  26  /  AlkPhos  54  10-23    PT/INR - ( 22 Oct 2017 12:36 )   PT: 14.6 SEC;   INR: 1.30          PTT - ( 22 Oct 2017 12:36 )  PTT:27.9 SEC      Urinalysis Basic - ( 22 Oct 2017 12:12 )    Color: YELLOW / Appearance: TURBID / S.021 / pH: 6.0  Gluc: NEGATIVE / Ketone: NEGATIVE  / Bili: NEGATIVE / Urobili: NORMAL E.U.   Blood: LARGE / Protein: 100 / Nitrite: NEGATIVE   Leuk Esterase: LARGE / RBC: 25-50 / WBC >50   Sq Epi: x / Non Sq Epi: x / Bacteria: MANY        Culture - Urine (collected 22 Oct 2017 13:45)  Source: URINE CATHETER  Preliminary Report (23 Oct 2017 09:57):    GNRID^Gram Neg Tor To Be Identified    COLONY COUNT: 50,000-99,000 CFU/mL          RADIOLOGY & ADDITIONAL TESTS:    Imaging Personally Reviewed:    Consultant(s) Notes Reviewed:      Care Discussed with Consultants/Other Providers:
Patient is a 57y old  Male who presents with a chief complaint of worsening lower abdominal pain x 3 days (22 Oct 2017 15:35)      SUBJECTIVE / OVERNIGHT EVENTS: Pt failed TOV, maria reinserted. Afebrile, no N/V/abd pain.     MEDICATIONS  (STANDING):  amLODIPine   Tablet 10 milliGRAM(s) Oral daily  cefTRIAXone   IVPB 1 Gram(s) IV Intermittent every 24 hours  docusate sodium 100 milliGRAM(s) Oral two times a day  pantoprazole    Tablet 40 milliGRAM(s) Oral before breakfast  polyethylene glycol 3350 17 Gram(s) Oral daily  senna 2 Tablet(s) Oral at bedtime  tamsulosin 0.4 milliGRAM(s) Oral at bedtime    MEDICATIONS  (PRN):  acetaminophen   Tablet 650 milliGRAM(s) Oral every 6 hours PRN For Temp greater than 38 C (100.4 F)  acetaminophen   Tablet. 650 milliGRAM(s) Oral every 6 hours PRN mild and moderate pain  fluticasone propionate 50 MICROgram(s)/spray Nasal Spray 1 Spray(s) Both Nostrils daily PRN nasal congestion      Vital Signs Last 24 Hrs  T(C): 36.8 (24 Oct 2017 12:38), Max: 37.1 (23 Oct 2017 15:23)  T(F): 98.3 (24 Oct 2017 12:38), Max: 98.7 (23 Oct 2017 15:23)  HR: 60 (24 Oct 2017 12:38) (60 - 68)  BP: 111/72 (24 Oct 2017 12:38) (96/59 - 124/82)  BP(mean): --  RR: 18 (24 Oct 2017 12:38) (18 - 18)  SpO2: 100% (24 Oct 2017 12:38) (100% - 100%)  CAPILLARY BLOOD GLUCOSE        I&O's Summary    23 Oct 2017 07:01  -  24 Oct 2017 07:00  --------------------------------------------------------  IN: 410 mL / OUT: 1700 mL / NET: -1290 mL        PHYSICAL EXAM:  GENERAL: NAD, well-developed  HEAD:  Atraumatic, Normocephalic  EYES: EOMI, PERRLA, conjunctiva and sclera clear  NECK: Supple, No JVD  CHEST/LUNG: Clear to auscultation bilaterally; No wheeze  HEART: Regular rate and rhythm; No murmurs, rubs, or gallops  ABDOMEN: Soft, Nontender, Nondistended; Bowel sounds present. Maria in place.   EXTREMITIES:  2+ Peripheral Pulses, No clubbing, cyanosis, or edema  PSYCH: AAOx3  NEUROLOGY: non-focal  SKIN: No rashes or lesions    LABS:                        13.0   7.46  )-----------( 203      ( 23 Oct 2017 06:12 )             39.3     10-23    142  |  102  |  12  ----------------------------<  101<H>  3.8   |  28  |  1.09    Ca    8.9      23 Oct 2017 06:13  Phos  2.6     10-23  Mg     2.0     10-23    TPro  6.6  /  Alb  3.4  /  TBili  0.7  /  DBili  x   /  AST  20  /  ALT  26  /  AlkPhos  54  10-23              Culture - Urine (collected 22 Oct 2017 13:45)  Source: URINE CATHETER  Preliminary Report (23 Oct 2017 09:57):    GNRID^Gram Neg Tor To Be Identified    COLONY COUNT: 50,000-99,000 CFU/mL          RADIOLOGY & ADDITIONAL TESTS:    Imaging Personally Reviewed:    < from: CT Abdomen and Pelvis No Cont (10.23.17 @ 15:15) >  IMPRESSION:    Mild right hydroureteronephrosis unchanged without evidence of an   obstructing renal calculus.  Tiny bilateral nonobstructing calculi.  Right-sidedureteritis.    < end of copied text >      Consultant(s) Notes Reviewed:      Care Discussed with Consultants/Other Providers:
Patient is a 57y old  Male who presents with a chief complaint of worsening lower abdominal pain x 3 days (25 Oct 2017 13:37)      SUBJECTIVE / OVERNIGHT EVENTS: afebrile, no N/V/abdominal pain, urine clear.     MEDICATIONS  (STANDING):  amLODIPine   Tablet 10 milliGRAM(s) Oral daily  ciprofloxacin     Tablet 500 milliGRAM(s) Oral every 12 hours  docusate sodium 100 milliGRAM(s) Oral two times a day  finasteride 5 milliGRAM(s) Oral daily  pantoprazole    Tablet 40 milliGRAM(s) Oral before breakfast  polyethylene glycol 3350 17 Gram(s) Oral daily  senna 2 Tablet(s) Oral at bedtime  tamsulosin 0.4 milliGRAM(s) Oral two times a day    MEDICATIONS  (PRN):  acetaminophen   Tablet 650 milliGRAM(s) Oral every 6 hours PRN For Temp greater than 38 C (100.4 F)  acetaminophen   Tablet. 650 milliGRAM(s) Oral every 6 hours PRN mild and moderate pain  fluticasone propionate 50 MICROgram(s)/spray Nasal Spray 1 Spray(s) Both Nostrils daily PRN nasal congestion      Vital Signs Last 24 Hrs  T(C): 36.7 (25 Oct 2017 14:38), Max: 36.8 (25 Oct 2017 05:50)  T(F): 98.1 (25 Oct 2017 14:38), Max: 98.2 (25 Oct 2017 05:50)  HR: 85 (25 Oct 2017 14:38) (61 - 85)  BP: 105/74 (25 Oct 2017 14:38) (105/74 - 113/74)  BP(mean): --  RR: 17 (25 Oct 2017 14:38) (17 - 17)  SpO2: 100% (25 Oct 2017 14:38) (100% - 100%)  CAPILLARY BLOOD GLUCOSE        I&O's Summary    24 Oct 2017 07:01  -  25 Oct 2017 07:00  --------------------------------------------------------  IN: 1010 mL / OUT: 1400 mL / NET: -390 mL    25 Oct 2017 07:01  -  25 Oct 2017 17:22  --------------------------------------------------------  IN: 0 mL / OUT: 750 mL / NET: -750 mL        PHYSICAL EXAM:  GENERAL: NAD, well-developed  HEAD:  Atraumatic, Normocephalic  EYES: EOMI, PERRLA, conjunctiva and sclera clear  NECK: Supple, No JVD  CHEST/LUNG: Clear to auscultation bilaterally; No wheeze  HEART: Regular rate and rhythm; No murmurs, rubs, or gallops  ABDOMEN: Soft, Nontender, Nondistended; Bowel sounds present, maria in place  EXTREMITIES:  2+ Peripheral Pulses, No clubbing, cyanosis, or edema  PSYCH: AAOx3  NEUROLOGY: non-focal  SKIN: No rashes or lesions    LABS:        Culture - Urine (10.22.17 @ 13:45)    -  Amikacin: S <=16 TONIO    -  Ampicillin: R <=8 TONIO    -  Ampicillin/Sulbactam: R <=8/4 TONIO    -  Aztreonam: S <=4 TONIO    -  Cefazolin: R >16 TONIO    -  Cefepime: S <=4 TONIO    -  Cefoxitin: R 16 TONIO    -  Ceftazidime: S <=1 TONIO    -  Ceftriaxone: S <=1 TONIO    -  Cefuroxime: R <=4 TONIO    -  Ciprofloxacin: S <=1 TONIO    -  Ertapenem: S <=1 TONIO    -  Gentamicin: S <=4 TONIO    -  Imipenem: S <=1 TONIO    -  Levofloxacin: S <=2 TONIO    -  Meropenem: S <=1 TONIO    -  Nitrofurantoin: S <=32 TONIO    -  Piperacillin/Tazobactam: S <=16 TONIO    -  Tigecycline: S <=2 TONIO    -  Tobramycin: S <=4 TONIO    -  Trimethoprim/Sulfamethoxazole: S <=2/38 TONIO    Culture - Urine:   COLONY COUNT: 50,000-99,000 CFU/mL    Specimen Source: URINE CATHETER    Organism Identification: Escherichia coli    Organism: Escherichia coli    Method Type: MICROSCAN NEG URINE COMBO 61                    RADIOLOGY & ADDITIONAL TESTS:    Imaging Personally Reviewed:    Consultant(s) Notes Reviewed:      Care Discussed with Consultants/Other Providers:

## 2017-10-25 NOTE — PROGRESS NOTE ADULT - PROBLEM SELECTOR PLAN 8
DVT: VTE ppx not indicated, pt low risk and ambulating.

## 2017-10-25 NOTE — PROGRESS NOTE ADULT - ASSESSMENT
56yo M with PMH HTN, GERD, recurrent nephrolithiasis s/p lithotripsy on 10/20 (has had 4 in total, first 13 years ago) presenting with worsening R flank and lower abdominal pain over three days since the procedure, a/w acute complicated UTI, AMARJIT likely 2/2 urinary retention, post-procedure obstructive uropathy, and possible pyelonephritis. Renal function normalized after maria and IVF. Ux GNR on ceftriaxone.
58yo M with PMH HTN, GERD, recurrent nephrolithiasis s/p lithotripsy on 10/20 (has had 4 in total, first 13 years ago) presenting with worsening R flank and lower abdominal pain over three days since the procedure, a/w acute complicated UTI, AMARJIT likely 2/2 urinary retention, post-procedure obstructive uropathy, and possible pyelonephritis. Renal function normalized after maria and IVF. Ux GNR on ceftriaxone. CT abd Mild right hydroureteronephrosis  with nonconstructive renal calculus, and right sided urethritis.
56yo M with PMH HTN, GERD, recurrent nephrolithiasis s/p lithotripsy on 10/20 (has had 4 in total, first 13 years ago) presenting with worsening R flank and lower abdominal pain over three days since the procedure, a/w acute complicated UTI, AMARJIT likely 2/2 urinary retention, post-procedure obstructive uropathy, and possible pyelonephritis. Renal function normalized after maria and IVF. Ux GNR on ceftriaxone. CT abd Mild right hydroureteronephrosis  with nonconstructive renal calculus, and right sided urethritis.

## 2017-10-25 NOTE — PROGRESS NOTE ADULT - PROBLEM SELECTOR PLAN 7
c/w home amlodipine 10mg q24h with holding parameters.

## 2017-10-25 NOTE — PROGRESS NOTE ADULT - PROBLEM SELECTOR PLAN 3
As above, suspect obstructive etiology given urinary retention and 1.15 cc removed with Hermosillo insertion in ED.  -c/w Hermosillo, TOV today  -CT abd.  -f/u with 
As above, suspect obstructive etiology given urinary retention and 1.15 cc removed with Hermosillo insertion in ED.  -c/w Hermosillo, failure TOV. follow up with .
As above, suspect obstructive etiology given urinary retention and 1.15 cc removed with Hermosillo insertion in ED.  -c/w Hermosillo, failure TOV. follow up with  out patient as above

## 2017-10-25 NOTE — DISCHARGE NOTE ADULT - MEDICATION SUMMARY - MEDICATIONS TO CHANGE
I will SWITCH the dose or number of times a day I take the medications listed below when I get home from the hospital:    Flomax 0.4 mg oral capsule  -- 1 cap(s) by mouth once a day  -- It is very important that you take or use this exactly as directed.  Do not skip doses or discontinue unless directed by your doctor.  May cause drowsiness.  Alcohol may intensify this effect.  Use care when operating dangerous machinery.  Some non-prescription drugs may aggravate your condition.  Read all labels carefully.  If a warning appears, check with your doctor before taking.  Swallow whole.  Do not crush.  Take with food or milk.

## 2017-10-25 NOTE — PROGRESS NOTE ADULT - PROBLEM SELECTOR PLAN 2
Likely 2/2 obstructive nephropathy given hx of recurrent nephrolithiasis, multiple lithotripsies in past and recent 10/20 procedure. Will f/u CT abd, Cr improve.
Likely 2/2 obstructive nephropathy given hx of recurrent nephrolithiasis, multiple lithotripsies in past and recent 10/20 procedure. Cr improves. continue IV ceftriaxone. continue maria.
Likely 2/2 obstructive nephropathy given hx of recurrent nephrolithiasis, multiple lithotripsies in past and recent 10/20 procedure. Cr improves. finishing total 10 days of Abx. continue maria. follow up appointment made for Pt on 10/30/17.

## 2017-10-25 NOTE — DISCHARGE NOTE ADULT - HOSPITAL COURSE
56yo M with PMH HTN, GERD, recurrent nephrolithiasis s/p lithotripsy on 10/20 (has had 4 in total, first 13 years ago) presenting with worsening R flank and lower abdominal pain over three days since the procedure, a/w acute complicated UTI, AMARJIT likely 2/2 urinary retention of unclear etiology, and possible pyelonephritis.     Hospital Course    Pyelonephritis- Leukocytosis with neutrophilic predominance, R flank pain started Ceftriaxone 1g q24h switched to Cipro on discharge. UA -pos, Urine Cx- E-Coli. CT A/P -Mild right hydroureteronephrosis unchanged without evidence of an obstructing renal calculus. Tiny bilateral non obstructing calculi. Right-sided ureteritis. Urologist is Dr. Kevin Ortez out pt     AMARJIT (acute kidney injury) - Likely 2/2 obstructive nephropathy given hx of recurrent nephrolithiasis, multiple lithotripsies in past and recent 10/20 procedure. Resolved     Urinary retention- 1.15 cc removed with Hermosillo insertion in ED. Case discussed with Dr Ortez private Urologist. 10/23 Hermosillo d/c, TOV failed. Out pt TOV on Monday with Dr Ortez. 10/24 started Proscar, Flomax increased to BID    Abdominal pain- Colace, Miralax. GERD (gastroesophageal reflux disease)- Hold home omeprazole given AMARJIT.     Cough- Pt s/p Azithromycin x 3 days last week.     HTN (hypertension) - c/w home Amlodipine 10mg q24h with holding parameters.     Prophylactic measure- DVT: VTE ppx not indicated, pt low risk and ambulating. 58yo M with PMH HTN, GERD, recurrent nephrolithiasis s/p lithotripsy on 10/20 (has had 4 in total, first 13 years ago) presenting with worsening R flank and lower abdominal pain over three days since the procedure, a/w acute complicated UTI, AMARJIT likely 2/2 urinary retention of unclear etiology, and possible pyelonephritis.     Hospital Course    Pyelonephritis- Leukocytosis with neutrophilic predominance, R flank pain started Ceftriaxone 1g q24h switched to Cipro on discharge. UA -pos, Urine Cx- E-Coli. CT A/P -Mild right hydroureteronephrosis unchanged without evidence of an obstructing renal calculus. Tiny bilateral non obstructing calculi. Right-sided ureteritis. Urologist is Dr. Kevin Ortez out pt. finishing cipro oral out patient. total 10 days of Abx.     AMARJIT (acute kidney injury) - Likely 2/2 obstructive nephropathy given hx of recurrent nephrolithiasis, multiple lithotripsies in past and recent 10/20 procedure. Resolved     Urinary retention- 1.15 cc removed with Hermosillo insertion in ED. failed TOV in hospital. Hermosillo reinserted by . Case discussed with Dr Ortez private Urologist. 10/23 Hermosillo d/c, TOV failed. Out pt TOV on Monday with Dr Ortez. 10/24 started Proscar, Flomax increased to BID    Abdominal pain- Colace, Miralax. GERD (gastroesophageal reflux disease)- Hold home omeprazole given AMARJIT.     Cough- Pt s/p Azithromycin x 3 days last week.     HTN (hypertension) - c/w home Amlodipine 10mg q24h with holding parameters.     Prophylactic measure- DVT: VTE ppx not indicated, pt low risk and ambulating.

## 2017-10-25 NOTE — PROGRESS NOTE ADULT - PROBLEM SELECTOR PLAN 4
Much improved lower>upper abdominal pain since Hermosillo, though pt also endorses only 1 BM (loose) since procedure in the setting of poor po intake and opiate use. continue Colace given recent opiate use. mostly resolved abdominal pain with Hermosillo.

## 2017-10-25 NOTE — DISCHARGE NOTE ADULT - CARE PLAN
Principal Discharge DX:	Obstructive nephropathy  Goal:	outpatient follow up with Dr Ortez  Instructions for follow-up, activity and diet:	- started Ceftriaxone 1g q24h in the hospital switched to Cipro 500 mg oral 2xdaily x4 more days  - UA -positive, Urine Cx- E-Coli  - CT A/P -Mild right hydroureteronephrosis unchanged without evidence of an obstructing renal calculus. Tiny bilateral non obstructing calculi. Right-sided ureteritis.  - Follow up with Urologist Dr. Kevin Ortez as out pt on Monday for trial of voiding to remove Hermosillo cath.  Secondary Diagnosis:	Pyelonephritis  Goal:	Remain free from infection  Instructions for follow-up, activity and diet:	- started Ceftriaxone 1g q24h in the hospital switched to Cipro 500 mg oral 2xdaily x4 more days  - UA -positive, Urine Cx- E-Coli  - CT A/P -Mild right hydroureteronephrosis unchanged without evidence of an obstructing renal calculus. Tiny bilateral non obstructing calculi. Right-sided ureteritis.  - Follow up with Urologist Dr. eKvin Ortez as out pt on Monday for trial of voiding to remove Hermosillo cath.  Secondary Diagnosis:	Urinary retention  Instructions for follow-up, activity and diet:	- started Ceftriaxone 1g q24h in the hospital switched to Cipro 500 mg oral 2xdaily x4 more days  - UA -positive, Urine Cx- E-Coli  - CT A/P -Mild right hydroureteronephrosis unchanged without evidence of an obstructing renal calculus. Tiny bilateral non obstructing calculi. Right-sided ureteritis.  - 10/24 started Proscar, Flomax increased to BID  - Follow up with Urologist Dr. Kevin Ortez as out pt on Monday for trial of voiding to remove Hermosillo cath.  Secondary Diagnosis:	AMARJIT (acute kidney injury)  Goal:	Resolved  Instructions for follow-up, activity and diet:	Follow up with PCP as outpatient for further management and treatment.  Secondary Diagnosis:	Essential hypertension  Instructions for follow-up, activity and diet:	Continue monitoring blood pressure at home, take medications as prescribed. Follow up with PCP as outpatient for further management and treatment. Principal Discharge DX:	Obstructive nephropathy  Goal:	outpatient follow up with Dr Ortez  Instructions for follow-up, activity and diet:	- started Ceftriaxone 1g q24h in the hospital switched to Cipro 500 mg oral 2xdaily x7 more days to complete 10 days  - UA -positive, Urine Cx- E-Coli  - CT A/P -Mild right hydroureteronephrosis unchanged without evidence of an obstructing renal calculus. Tiny bilateral non obstructing calculi. Right-sided ureteritis.  - Follow up with Urologist Dr. Kevin Ortez as out pt on Monday for trial of voiding to remove Hermosillo cath.  Secondary Diagnosis:	Pyelonephritis  Goal:	Remain free from infection  Instructions for follow-up, activity and diet:	- started Ceftriaxone 1g q24h in the hospital switched to Cipro 500 mg oral 2xdaily x 7 more days  - UA -positive, Urine Cx- E-Coli  - CT A/P -Mild right hydroureteronephrosis unchanged without evidence of an obstructing renal calculus. Tiny bilateral non obstructing calculi. Right-sided ureteritis.  - Follow up with Urologist Dr. Kevin Ortez as out pt on Monday for trial of voiding to remove Hermosillo cath.  Secondary Diagnosis:	Urinary retention  Instructions for follow-up, activity and diet:	- started Ceftriaxone 1g q24h in the hospital switched to Cipro 500 mg oral 2xdaily x 7 more days  - UA -positive, Urine Cx- E-Coli  - CT A/P -Mild right hydroureteronephrosis unchanged without evidence of an obstructing renal calculus. Tiny bilateral non obstructing calculi. Right-sided ureteritis.  - 10/24 started Proscar, Flomax increased to BID  - Follow up with Urologist Dr. Kevin Ortez as out pt on Monday for trial of voiding to remove Hermosillo cath.  Secondary Diagnosis:	AMARJIT (acute kidney injury)  Goal:	Resolved  Instructions for follow-up, activity and diet:	Follow up with PCP as outpatient for further management and treatment.  Secondary Diagnosis:	Essential hypertension  Instructions for follow-up, activity and diet:	Continue monitoring blood pressure at home, take medications as prescribed. Follow up with PCP as outpatient for further management and treatment. Principal Discharge DX:	Obstructive nephropathy  Goal:	outpatient follow up with Dr Vital  Instructions for follow-up, activity and diet:	- started Ceftriaxone 1g q24h in the hospital switched to Cipro 500 mg oral 2xdaily x7 more days to complete 10 days  - UA -positive, Urine Cx- E-Coli  - CT A/P -Mild right hydroureteronephrosis unchanged without evidence of an obstructing renal calculus. Tiny bilateral non obstructing calculi. Right-sided ureteritis.  - Follow up with Urologist Dr. Kevin Ortez as out pt on Monday for trial of voiding to remove Hermosillo cath.  Secondary Diagnosis:	Pyelonephritis  Goal:	Remain free from infection  Instructions for follow-up, activity and diet:	- started Ceftriaxone 1g q24h in the hospital switched to Cipro 500 mg oral 2xdaily x 7 more days  - UA -positive, Urine Cx- E-Coli  - CT A/P -Mild right hydroureteronephrosis unchanged without evidence of an obstructing renal calculus. Tiny bilateral non obstructing calculi. Right-sided ureteritis.  - Follow up with Urologist Dr. Kevin Ortez as out pt on Monday for trial of voiding to remove Hermosillo cath.  Secondary Diagnosis:	Urinary retention  Instructions for follow-up, activity and diet:	- started Ceftriaxone 1g q24h in the hospital switched to Cipro 500 mg oral 2xdaily x 7 more days  - UA -positive, Urine Cx- E-Coli  - CT A/P -Mild right hydroureteronephrosis unchanged without evidence of an obstructing renal calculus. Tiny bilateral non obstructing calculi. Right-sided ureteritis.  - 10/24 started Proscar, Flomax increased to BID  - Follow up with Urologist Dr. Kevin Ortez as out pt on Monday for trial of voiding to remove Hermosillo cath.  Secondary Diagnosis:	AMARJIT (acute kidney injury)  Goal:	Resolved  Instructions for follow-up, activity and diet:	Follow up with PCP as outpatient for further management and treatment.  Secondary Diagnosis:	Essential hypertension  Instructions for follow-up, activity and diet:	Continue monitoring blood pressure at home, take medications as prescribed. Follow up with PCP as outpatient for further management and treatment.

## 2017-10-25 NOTE — PROGRESS NOTE ADULT - PROBLEM SELECTOR PLAN 1
Leukocytosis with neutrophilic predominance, R flank pain concerning for pyelonephritis likely in the setting of post-procedure obstructive nephropathy. Pt recently had laser lithotripsy on 10/20 with progressive suprapubic pain, R flank pain and dysuria/ urinary hesitancy since procedure.   -c/w ceftriaxone 1g q24h  -Trend CBC's, f/u Ux for I&S  - CT abd as discussed with  Dr. Rose
Leukocytosis with neutrophilic predominance, R flank pain concerning for pyelonephritis likely in the setting of post-procedure obstructive nephropathy. Pt recently had laser lithotripsy on 10/20 with progressive suprapubic pain, R flank pain and dysuria/ urinary hesitancy since procedure.   -c/w ceftriaxone 1g q24h  -Trend CBC's, f/u Ux for I&S  - CT abd  Mild right hydroureteronephrosis  with nonconstructive renal calculus, and right sided urethritis. follow up  Dr. Rose, may call [place] RENETTA, Pt failed TOV and maria reinserted.
Leukocytosis with neutrophilic predominance, R flank pain concerning for pyelonephritis likely in the setting of post-procedure obstructive nephropathy. Pt recently had laser lithotripsy on 10/20 with progressive suprapubic pain, R flank pain and dysuria/ urinary hesitancy since procedure.   -s/p ceftriaxone 1g q24h 3 days, changed to oral cipro to finishing total 10 days of Abx according to Ux sensitivity.  -WBC wnl,  - CT abd  Mild right hydroureteronephrosis  with nonconstructive renal calculus, and right sided urethritis. follow up  Dr. Rose out patient, Pt failed TOV and [place] reinserted. f/u  outpatient, increased flomax 4mg bid, and started Proscar.

## 2017-10-25 NOTE — DISCHARGE NOTE ADULT - PLAN OF CARE
outpatient follow up with Dr Ortez - started Ceftriaxone 1g q24h in the hospital switched to Cipro 500 mg oral 2xdaily x4 more days  - UA -positive, Urine Cx- E-Coli  - CT A/P -Mild right hydroureteronephrosis unchanged without evidence of an obstructing renal calculus. Tiny bilateral non obstructing calculi. Right-sided ureteritis.  - Follow up with Urologist Dr. Kevin Ortez as out pt on Monday for trial of voiding to remove [place] cath. Remain free from infection - started Ceftriaxone 1g q24h in the hospital switched to Cipro 500 mg oral 2xdaily x4 more days  - UA -positive, Urine Cx- E-Coli  - CT A/P -Mild right hydroureteronephrosis unchanged without evidence of an obstructing renal calculus. Tiny bilateral non obstructing calculi. Right-sided ureteritis.  - 10/24 started Proscar, Flomax increased to BID  - Follow up with Urologist Dr. Kevin Ortez as out pt on Monday for trial of voiding to remove [place] cath. Resolved Follow up with PCP as outpatient for further management and treatment. Continue monitoring blood pressure at home, take medications as prescribed. Follow up with PCP as outpatient for further management and treatment. - started Ceftriaxone 1g q24h in the hospital switched to Cipro 500 mg oral 2xdaily x7 more days to complete 10 days  - UA -positive, Urine Cx- E-Coli  - CT A/P -Mild right hydroureteronephrosis unchanged without evidence of an obstructing renal calculus. Tiny bilateral non obstructing calculi. Right-sided ureteritis.  - Follow up with Urologist Dr. Kevin Ortez as out pt on Monday for trial of voiding to remove [place] cath. - started Ceftriaxone 1g q24h in the hospital switched to Cipro 500 mg oral 2xdaily x 7 more days  - UA -positive, Urine Cx- E-Coli  - CT A/P -Mild right hydroureteronephrosis unchanged without evidence of an obstructing renal calculus. Tiny bilateral non obstructing calculi. Right-sided ureteritis.  - Follow up with Urologist Dr. Kevin Ortez as out pt on Monday for trial of voiding to remove [place] cath. - started Ceftriaxone 1g q24h in the hospital switched to Cipro 500 mg oral 2xdaily x 7 more days  - UA -positive, Urine Cx- E-Coli  - CT A/P -Mild right hydroureteronephrosis unchanged without evidence of an obstructing renal calculus. Tiny bilateral non obstructing calculi. Right-sided ureteritis.  - 10/24 started Proscar, Flomax increased to BID  - Follow up with Urologist Dr. Kevin Ortez as out pt on Monday for trial of voiding to remove [place] cath. outpatient follow up with Dr Vital

## 2017-10-25 NOTE — DISCHARGE NOTE ADULT - MEDICATION SUMMARY - MEDICATIONS TO TAKE
I will START or STAY ON the medications listed below when I get home from the hospital:    finasteride 5 mg oral tablet  -- 1 tab(s) by mouth once a day  -- Indication: For BPH    acetaminophen 325 mg oral tablet  -- 2 tab(s) by mouth every 6 hours, As needed, mild and moderate pain  -- Indication: For Pain    tamsulosin 0.4 mg oral capsule  -- 1 cap(s) by mouth 2 times a day  -- Indication: For BPH    loratadine 10 mg oral tablet  -- 1 tab(s) by mouth once a day  -- Indication: For Prophylactic measure    amLODIPine 10 mg oral tablet  -- 1 tab(s) by mouth once a day  -- Indication: For HTN (hypertension)    senna oral tablet  -- 2 tab(s) by mouth once a day (at bedtime)  -- Indication: For Constipation    polyethylene glycol 3350 oral powder for reconstitution  -- 17 gram(s) by mouth once a day  -- Indication: For Constipation    Nasonex 50 mcg/inh nasal spray  -- 2 spray(s) into nose once a day, As Needed  -- Indication: For Post nasal drip    omeprazole 40 mg oral delayed release capsule  -- 1 cap(s) by mouth once a day  -- Indication: For GERD (gastroesophageal reflux disease)    ciprofloxacin 500 mg oral tablet  -- 1 tab(s) by mouth every 12 hours  -- Indication: For Pyelonephritis

## 2017-10-25 NOTE — PROGRESS NOTE ADULT - PROBLEM SELECTOR PLAN 6
Pt was recently prescribed short course of abx for cough with minimal improvement. Cough is occasionally productive of white sputum. No other URI symptoms, fevers, chills. Suspect 2/2 GERD as pt endorses worsening GERD symptoms since procedure. Restart PPI as AMARJIT resolves. Lungs CTAB on exam. recent CXR clear lungs.    -Restart PPI as AMARJIT resolves.
Pt was recently prescribed short course of abx for cough with minimal improvement. Cough is occasionally productive of white sputum. No other URI symptoms, fevers, chills. Suspect 2/2 GERD as pt endorses worsening GERD symptoms since procedure. Restarted PPI as AMARJIT resolves. Lungs CTAB on exam. recent CXR clear lungs.
Pt was recently prescribed short course of abx for cough with minimal improvement. Cough is occasionally productive of white sputum. No other URI symptoms, fevers, chills. Suspect 2/2 GERD as pt endorses worsening GERD symptoms since procedure. Restart PPI as AMARJIT resolves. Lungs CTAB on exam. recent CXR clear lungs.    -Restart PPI as AMARJIT resolves.

## 2018-10-26 ENCOUNTER — TRANSCRIPTION ENCOUNTER (OUTPATIENT)
Age: 58
End: 2018-10-26

## 2018-10-26 PROBLEM — N20.0 CALCULUS OF KIDNEY: Chronic | Status: ACTIVE | Noted: 2017-08-17

## 2018-10-29 ENCOUNTER — APPOINTMENT (OUTPATIENT)
Dept: ORTHOPEDIC SURGERY | Facility: CLINIC | Age: 58
End: 2018-10-29
Payer: COMMERCIAL

## 2018-10-29 VITALS
HEIGHT: 67 IN | HEART RATE: 67 BPM | DIASTOLIC BLOOD PRESSURE: 77 MMHG | WEIGHT: 135 LBS | SYSTOLIC BLOOD PRESSURE: 116 MMHG | BODY MASS INDEX: 21.19 KG/M2

## 2018-10-29 DIAGNOSIS — M54.12 RADICULOPATHY, CERVICAL REGION: ICD-10-CM

## 2018-10-29 DIAGNOSIS — M47.812 SPONDYLOSIS W/OUT MYELOPATHY OR RADICULOPATHY, CERVICAL REGION: ICD-10-CM

## 2018-10-29 PROCEDURE — 99203 OFFICE O/P NEW LOW 30 MIN: CPT

## 2018-10-29 PROCEDURE — 72040 X-RAY EXAM NECK SPINE 2-3 VW: CPT

## 2019-03-28 ENCOUNTER — EMERGENCY (EMERGENCY)
Facility: HOSPITAL | Age: 59
LOS: 1 days | Discharge: HOME | End: 2019-03-28
Attending: EMERGENCY MEDICINE | Admitting: EMERGENCY MEDICINE
Payer: COMMERCIAL

## 2019-03-28 VITALS
HEART RATE: 110 BPM | OXYGEN SATURATION: 100 % | TEMPERATURE: 98 F | SYSTOLIC BLOOD PRESSURE: 156 MMHG | RESPIRATION RATE: 16 BRPM | DIASTOLIC BLOOD PRESSURE: 101 MMHG

## 2019-03-28 VITALS — HEART RATE: 80 BPM | DIASTOLIC BLOOD PRESSURE: 90 MMHG | SYSTOLIC BLOOD PRESSURE: 138 MMHG

## 2019-03-28 DIAGNOSIS — Z98.89 OTHER SPECIFIED POSTPROCEDURAL STATES: Chronic | ICD-10-CM

## 2019-03-28 DIAGNOSIS — Z98.890 OTHER SPECIFIED POSTPROCEDURAL STATES: Chronic | ICD-10-CM

## 2019-03-28 LAB
ALBUMIN SERPL ELPH-MCNC: 4.6 G/DL — SIGNIFICANT CHANGE UP (ref 3.3–5)
ALP SERPL-CCNC: 77 U/L — SIGNIFICANT CHANGE UP (ref 40–120)
ALT FLD-CCNC: 21 U/L — SIGNIFICANT CHANGE UP (ref 4–41)
ANION GAP SERPL CALC-SCNC: 12 MMO/L — SIGNIFICANT CHANGE UP (ref 7–14)
APPEARANCE UR: CLEAR — SIGNIFICANT CHANGE UP
AST SERPL-CCNC: 28 U/L — SIGNIFICANT CHANGE UP (ref 4–40)
BACTERIA # UR AUTO: NEGATIVE — SIGNIFICANT CHANGE UP
BASOPHILS # BLD AUTO: 0.02 K/UL — SIGNIFICANT CHANGE UP (ref 0–0.2)
BASOPHILS NFR BLD AUTO: 0.2 % — SIGNIFICANT CHANGE UP (ref 0–2)
BILIRUB SERPL-MCNC: 0.3 MG/DL — SIGNIFICANT CHANGE UP (ref 0.2–1.2)
BILIRUB UR-MCNC: NEGATIVE — SIGNIFICANT CHANGE UP
BLOOD UR QL VISUAL: HIGH
BUN SERPL-MCNC: 11 MG/DL — SIGNIFICANT CHANGE UP (ref 7–23)
CALCIUM SERPL-MCNC: 9.4 MG/DL — SIGNIFICANT CHANGE UP (ref 8.4–10.5)
CHLORIDE SERPL-SCNC: 99 MMOL/L — SIGNIFICANT CHANGE UP (ref 98–107)
CO2 SERPL-SCNC: 27 MMOL/L — SIGNIFICANT CHANGE UP (ref 22–31)
COLOR SPEC: COLORLESS — SIGNIFICANT CHANGE UP
CREAT SERPL-MCNC: 1.08 MG/DL — SIGNIFICANT CHANGE UP (ref 0.5–1.3)
EOSINOPHIL # BLD AUTO: 0.03 K/UL — SIGNIFICANT CHANGE UP (ref 0–0.5)
EOSINOPHIL NFR BLD AUTO: 0.3 % — SIGNIFICANT CHANGE UP (ref 0–6)
GLUCOSE SERPL-MCNC: 123 MG/DL — HIGH (ref 70–99)
GLUCOSE UR-MCNC: NEGATIVE — SIGNIFICANT CHANGE UP
HCT VFR BLD CALC: 44.8 % — SIGNIFICANT CHANGE UP (ref 39–50)
HGB BLD-MCNC: 15.1 G/DL — SIGNIFICANT CHANGE UP (ref 13–17)
HYALINE CASTS # UR AUTO: NEGATIVE — SIGNIFICANT CHANGE UP
IMM GRANULOCYTES NFR BLD AUTO: 0.7 % — SIGNIFICANT CHANGE UP (ref 0–1.5)
KETONES UR-MCNC: NEGATIVE — SIGNIFICANT CHANGE UP
LEUKOCYTE ESTERASE UR-ACNC: NEGATIVE — SIGNIFICANT CHANGE UP
LYMPHOCYTES # BLD AUTO: 0.81 K/UL — LOW (ref 1–3.3)
LYMPHOCYTES # BLD AUTO: 8.2 % — LOW (ref 13–44)
MCHC RBC-ENTMCNC: 29.5 PG — SIGNIFICANT CHANGE UP (ref 27–34)
MCHC RBC-ENTMCNC: 33.7 % — SIGNIFICANT CHANGE UP (ref 32–36)
MCV RBC AUTO: 87.5 FL — SIGNIFICANT CHANGE UP (ref 80–100)
MONOCYTES # BLD AUTO: 0.51 K/UL — SIGNIFICANT CHANGE UP (ref 0–0.9)
MONOCYTES NFR BLD AUTO: 5.2 % — SIGNIFICANT CHANGE UP (ref 2–14)
NEUTROPHILS # BLD AUTO: 8.39 K/UL — HIGH (ref 1.8–7.4)
NEUTROPHILS NFR BLD AUTO: 85.4 % — HIGH (ref 43–77)
NITRITE UR-MCNC: NEGATIVE — SIGNIFICANT CHANGE UP
NRBC # FLD: 0 K/UL — SIGNIFICANT CHANGE UP (ref 0–0)
PH UR: 6.5 — SIGNIFICANT CHANGE UP (ref 5–8)
PLATELET # BLD AUTO: 212 K/UL — SIGNIFICANT CHANGE UP (ref 150–400)
PMV BLD: 9.2 FL — SIGNIFICANT CHANGE UP (ref 7–13)
POTASSIUM SERPL-MCNC: 3.7 MMOL/L — SIGNIFICANT CHANGE UP (ref 3.5–5.3)
POTASSIUM SERPL-SCNC: 3.7 MMOL/L — SIGNIFICANT CHANGE UP (ref 3.5–5.3)
PROT SERPL-MCNC: 7.7 G/DL — SIGNIFICANT CHANGE UP (ref 6–8.3)
PROT UR-MCNC: NEGATIVE — SIGNIFICANT CHANGE UP
RBC # BLD: 5.12 M/UL — SIGNIFICANT CHANGE UP (ref 4.2–5.8)
RBC # FLD: 12.6 % — SIGNIFICANT CHANGE UP (ref 10.3–14.5)
RBC CASTS # UR COMP ASSIST: HIGH (ref 0–?)
SODIUM SERPL-SCNC: 138 MMOL/L — SIGNIFICANT CHANGE UP (ref 135–145)
SP GR SPEC: 1.01 — SIGNIFICANT CHANGE UP (ref 1–1.04)
SQUAMOUS # UR AUTO: SIGNIFICANT CHANGE UP
UROBILINOGEN FLD QL: NORMAL — SIGNIFICANT CHANGE UP
WBC # BLD: 9.83 K/UL — SIGNIFICANT CHANGE UP (ref 3.8–10.5)
WBC # FLD AUTO: 9.83 K/UL — SIGNIFICANT CHANGE UP (ref 3.8–10.5)
WBC UR QL: SIGNIFICANT CHANGE UP (ref 0–?)

## 2019-03-28 PROCEDURE — 99283 EMERGENCY DEPT VISIT LOW MDM: CPT

## 2019-03-28 NOTE — ED PROVIDER NOTE - PHYSICAL EXAMINATION
Attending/Jovan: Mod distress; PERRL/EOMI, non-icterus, supple, no KIRBY, no JVD, RRR, CTAB; Abd-soft, +distended urinary bladder; no LE edema, A&Ox3, nonfocal; Skin-warm/dry

## 2019-03-28 NOTE — ED ADULT NURSE NOTE - OBJECTIVE STATEMENT
Pt brought into intake room 3. A&OX4 ambulatory self care male presents to ed today for urinary retention since this morning patient states he went for an ultrasound this morning and drank "a lot of water." prior since than unable to urinate. 16 F maria placed. 600 cc's of clear yellow urine drained. Pt expressed relief. 20G IV placed in right ac labs sent.

## 2019-03-28 NOTE — ED PROVIDER NOTE - PROGRESS NOTE DETAILS
H[place] catheter placed by Dr. Jc, immediate return of urine, yellow, 800 mL w/ subsequent improvement of abd pain. [place] PGY2: Patient feels improved, approx 800cc of urine output, stable, feels better, no more chills, has a urologist to follow up with

## 2019-03-28 NOTE — ED PROVIDER NOTE - NSFOLLOWUPINSTRUCTIONS_ED_ALL_ED_FT
You were seen today for urinary retention. You had a maria placed. Your labs were unremarkable and your urine appeared clean but we will follow with a urine culture.     Please make an appointment to see your urologist within 5 days.     Please return to the emergency room for fevers, persistent vomiting, worsening pain, no urine output or any new worsening symptoms.     See your primary care doctor this week,.

## 2019-03-28 NOTE — ED PROVIDER NOTE - OBJECTIVE STATEMENT
Attending/Jovan: 57 yo M w/ h/o HTN, BPH, nephrolithiases p/w urinary retention. Pt has been experiencing urinary hesitancy, urgency, seen and eval by PMD this past Sat, started on Saturday and referred for an prostate US exam today as well as referred to an urologist. For the past four hours unable to ruinate and suprapubic pain, +chills. Denies n/v, CP, SOB, palp weakness or lightheadedness.

## 2019-03-28 NOTE — ED ADULT TRIAGE NOTE - CHIEF COMPLAINT QUOTE
Pt c/o urinary retention since today.  Pt stated he drank a lot of water prior to his Ultrasound and was unable to urinate since then

## 2019-03-30 LAB
BACTERIA UR CULT: SIGNIFICANT CHANGE UP
SPECIMEN SOURCE: SIGNIFICANT CHANGE UP

## 2020-10-24 NOTE — PROGRESS NOTE ADULT - PROBLEM SELECTOR PLAN 5
restarted PPI as AMARJIT resolved.
restarted PPI as AMARJIT resolved.
PA Royal at bedside to assess patient.
pt pronounced dead at 13;14 pm , son and wife at bedside
restarted PPI as AMARJIT resolved.

## 2023-12-28 NOTE — DISCHARGE NOTE ADULT - ABILITY TO HEAR (WITH HEARING AID OR HEARING APPLIANCE IF NORMALLY USED):
Shift Summary: [id number]    Vitals with min/max:      Vital Last Value 24 Hour Range   Temperature 97.2 °F (36.2 °C) (02/13/22 1800) Temp  Min: 94.5 °F (34.7 °C)  Max: 99.5 °F (37.5 °C)   Pulse 75 (02/13/22 1800) Pulse  Min: 65  Max: 96   Respiratory 20 (02/13/22 1800) Resp  Min: 14  Max: 37   Non-Invasive  Blood Pressure 100/71 (02/13/22 1800) BP  Min: 95/65  Max: 131/63   Pulse Oximetry 96 % (02/13/22 1800) SpO2  Min: 90 %  Max: 100 %   Arterial   Blood Pressure   No data recorded     Lines:    Available Intravenous Access     PICC Line / CVC Line / PIV Line / Intraosseous Line / Line / UAC Line            Peripheral IV 02/06/22 Right Forearm 20 7 days    PICC Line Triple Lumen 02/10/22 Right Basilic 3 days    Peripheral IV 02/12/22 Right Hand 22 1 day                Art Lines: Left radial art line placed 2/12      Neuro status: Sedated, pt localizes to all extremities, does not follow commands    Cardiac: SR with rates 60-70's, MAP target > 65 with pressors, ECHO limited done today with EF 75%    Respiratory: Vent ACVC+ 40%, PEEP 6, Vt 500, RR 20; minimal secretions    Ventilator Settings:   Vent Settings Last Value   FiO2 40 % (02/13/22 1800)   Mode Volume Control+ (02/13/22 1435)   Rate 20 (02/13/22 1435)   Tidal Volume 500 mL (02/13/22 1435)   PEEP/CPAC/EPAP 6 cm H20 (02/13/22 1435)     : [place]:  pt anuric wtih no output in 12 hours, on CVVH with net loss 0, Blood flow 300, Therapy fluid 2.5L    Hygiene:  Total Assistance;Perineal care;Catheter care, [facility] approved wipes (02/13/22 1200)    GI: Bowel Tones:   Absent  Last Bowel Movement: smear today    Incisions & Skin: Scattered bruising on BUE, dusky elbow, dry skin, pt had lower tooth that fell out today and is in specimen cup at bedside, pt has additional top tooth loose    Diet: NPO, TPN to be restarted tonight with no phosphorus in it     Activity:  Turn Q2H    I/O this shift:  In: -   Out: 376 [Drains:25; Other:351]    Restraints: Bilateral soft wrist  left and right, new order due at 2000 on 2/13    Pain: Pt appears comfortable on 100 mcg/hr Fentanyl     Current infusions: Precedex at 0.6, Fentanyl at 100, Levophed at 16, Vaso at 0.04, D10W at 50 mL/hr, TKO with supplements    Pertinent labs: Hgb 6.5- receiving 1 of 2 units currently, PLT count down to 46 from 78 earlier today- receiving 1 of 2 units Platelets; Ionized Ca 0.89- 3 g Ca Gluconate ordered and infusing, Lactic acid peaked at 13.4 and most recent value 12.3    Severe Sepsis Criteria Met?  (not recorded)    MD communications: Dr. Casas updated on patient throughout shift, spoke with Dr. Li on CVVH, GI and general surgery also here today to assess pt.    Patient or family concerns: Multiple family in room throughout day.  Pt has 5 children and 11 siblings. POA Stone at bedside the most today    Plan of care/upcoming events: Family has discussed code status with Dr. Casas and elected for DNR with no CPR. Family is coming in from out of [place] by tomorrow.      Deedee Degroot RN   not applicable Adequate: hears normal conversation without difficulty

## 2024-09-29 NOTE — PATIENT PROFILE ADULT. - FUNCTIONAL SCREEN CURRENT LEVEL: SWALLOWING (IF SCORE 2 OR MORE FOR ANY ITEM, CONSULT REHAB SERVICES), MLM)
[FreeTextEntry1] : 75 yo male presents today for a follow-up appointment for elevated PSA.  Previous transperineal prostate biopsy on 1/10/24 showed ASAP x3 cores and HPIN x1 core.   He had a recent PSA blood test. Here to discuss the results.   Recent PSA: 5.58 (9.16.24) Previous PSA: 9.37 (11.3.23) 7.36 on (8.4.23)  Pt c/o of nocturia x3 and frequency during the day- continue Tamsulosin.     (0) swallows foods/liquids without difficulty